# Patient Record
Sex: MALE | Race: WHITE | Employment: FULL TIME | URBAN - METROPOLITAN AREA
[De-identification: names, ages, dates, MRNs, and addresses within clinical notes are randomized per-mention and may not be internally consistent; named-entity substitution may affect disease eponyms.]

---

## 2022-09-23 ENCOUNTER — HOSPITAL ENCOUNTER (EMERGENCY)
Facility: CLINIC | Age: 52
Discharge: HOME | End: 2022-09-23
Attending: EMERGENCY MEDICINE
Payer: COMMERCIAL

## 2022-09-23 ENCOUNTER — APPOINTMENT (OUTPATIENT)
Dept: GENERAL RADIOLOGY | Facility: CLINIC | Age: 52
End: 2022-09-23
Payer: COMMERCIAL

## 2022-09-23 VITALS
WEIGHT: 150 LBS | HEIGHT: 72 IN | RESPIRATION RATE: 16 BRPM | DIASTOLIC BLOOD PRESSURE: 96 MMHG | TEMPERATURE: 100 F | HEART RATE: 98 BPM | BODY MASS INDEX: 20.32 KG/M2 | OXYGEN SATURATION: 95 % | SYSTOLIC BLOOD PRESSURE: 128 MMHG

## 2022-09-23 DIAGNOSIS — B34.9 VIRAL ILLNESS: Primary | ICD-10-CM

## 2022-09-23 LAB
FLU A ANTIGEN: NEGATIVE
FLU B ANTIGEN: NEGATIVE
SARS-COV-2, RAPID: NOT DETECTED
SPECIMEN DESCRIPTION: NORMAL

## 2022-09-23 PROCEDURE — 87635 SARS-COV-2 COVID-19 AMP PRB: CPT

## 2022-09-23 PROCEDURE — 99284 EMERGENCY DEPT VISIT MOD MDM: CPT

## 2022-09-23 PROCEDURE — 6370000000 HC RX 637 (ALT 250 FOR IP): Performed by: EMERGENCY MEDICINE

## 2022-09-23 PROCEDURE — 71045 X-RAY EXAM CHEST 1 VIEW: CPT

## 2022-09-23 PROCEDURE — 87804 INFLUENZA ASSAY W/OPTIC: CPT

## 2022-09-23 RX ORDER — ACETAMINOPHEN 325 MG/1
650 TABLET ORAL ONCE
Status: COMPLETED | OUTPATIENT
Start: 2022-09-23 | End: 2022-09-23

## 2022-09-23 RX ORDER — BENZONATATE 100 MG/1
100 CAPSULE ORAL 3 TIMES DAILY PRN
Qty: 30 CAPSULE | Refills: 0 | Status: SHIPPED | OUTPATIENT
Start: 2022-09-23 | End: 2022-10-03

## 2022-09-23 RX ADMIN — ACETAMINOPHEN 650 MG: 325 TABLET, FILM COATED ORAL at 21:09

## 2022-09-23 ASSESSMENT — PAIN DESCRIPTION - LOCATION: LOCATION: RIB CAGE

## 2022-09-23 ASSESSMENT — PAIN SCALES - GENERAL: PAINLEVEL_OUTOF10: 4

## 2022-09-23 ASSESSMENT — PAIN DESCRIPTION - FREQUENCY: FREQUENCY: INTERMITTENT

## 2022-09-23 ASSESSMENT — PAIN DESCRIPTION - DESCRIPTORS: DESCRIPTORS: TIGHTNESS

## 2022-09-23 ASSESSMENT — PAIN DESCRIPTION - PAIN TYPE: TYPE: ACUTE PAIN

## 2022-09-23 ASSESSMENT — PAIN DESCRIPTION - ONSET: ONSET: ON-GOING

## 2022-09-23 ASSESSMENT — PAIN DESCRIPTION - ORIENTATION: ORIENTATION: RIGHT

## 2022-09-24 NOTE — ED PROVIDER NOTES
nontender bowel sounds active throughout  Neuro: Patient has no gross focal neurological deficits at bedside exam    DIFFERENTIAL DIAGNOSIS/ MDM:     Viral illness COVID-pneumonia    DIAGNOSTIC RESULTS     EKG: All EKG's are interpreted by the Emergency Department Physician who either signs or Co-signs this chart in the absence of a cardiologist.        RADIOLOGY:   I directly visualized the following  images and reviewed the radiologist interpretations:  XR CHEST PORTABLE   Final Result      No acute findings in the chest.               LABS:  Labs Reviewed   RAPID INFLUENZA A/B ANTIGENS   COVID-19, RAPID         EMERGENCY DEPARTMENT COURSE:   Vitals:    Vitals:    09/23/22 2046 09/23/22 2153   BP: (!) 128/96    Pulse: (!) 102 98   Resp: 16    Temp: (!) 101.4 °F (38.6 °C) 100 °F (37.8 °C)   TempSrc: Oral Oral   SpO2: 95%    Weight: 68 kg (150 lb)    Height: 6' (1.829 m)      -------------------------  BP: (!) 128/96, Temp: 100 °F (37.8 °C), Heart Rate: 98, Resp: 16    Orders Placed This Encounter   Medications    acetaminophen (TYLENOL) tablet 650 mg    benzonatate (TESSALON PERLES) 100 MG capsule     Sig: Take 1 capsule by mouth 3 times daily as needed for Cough     Dispense:  30 capsule     Refill:  0           Re-evaluation Notes    X-ray per radiology shows nothing acute both COVID and influenza swabs are negative however findings are most consistent with viral etiology at this time patient will be discharged with symptomatic treatment follow-up as directed return if worse    CRITICAL CARE:   None      CONSULTS:      PROCEDURES:  None    FINAL IMPRESSION      1.  Viral illness          DISPOSITION/PLAN   DISPOSITION Decision To Discharge 09/23/2022 09:49:52 PM      Condition on Disposition    Stable    PATIENT REFERRED TO:  DO Kate Vickers  [id number] [phone]    In 2 days        DISCHARGE MEDICATIONS:  Discharge Medication List as of 9/23/2022  9:50 PM START taking these medications    Details   benzonatate (TESSALON PERLES) 100 MG capsule Take 1 capsule by mouth 3 times daily as needed for Cough, Disp-30 capsule, R-0Print             (Please note that portions of this note were completed with a voice recognition program.  Efforts were made to edit the dictations but occasionally words are mis-transcribed.)    Maldonado Galeana MD,, MD, F.A.C.E.P.   Attending Emergency Physician        Maldonado Galeana MD  09/24/22 7546

## 2022-11-18 ENCOUNTER — HOSPITAL ENCOUNTER (OUTPATIENT)
Dept: CT IMAGING | Facility: CLINIC | Age: 52
Discharge: HOME | End: 2022-11-20
Payer: COMMERCIAL

## 2022-11-18 DIAGNOSIS — R63.4 UNINTENDED WEIGHT LOSS: ICD-10-CM

## 2022-11-18 DIAGNOSIS — R91.8 PULMONARY NODULES: ICD-10-CM

## 2022-11-18 PROCEDURE — 71250 CT THORAX DX C-: CPT

## 2024-01-03 ENCOUNTER — OFFICE VISIT (OUTPATIENT)
Dept: DERMATOLOGY | Age: 54
End: 2024-01-03

## 2024-01-03 VITALS
HEART RATE: 90 BPM | OXYGEN SATURATION: 99 % | TEMPERATURE: 97.3 F | DIASTOLIC BLOOD PRESSURE: 81 MMHG | HEIGHT: 72 IN | WEIGHT: 143 LBS | SYSTOLIC BLOOD PRESSURE: 122 MMHG | BODY MASS INDEX: 19.37 KG/M2

## 2024-01-03 DIAGNOSIS — L30.8 OTHER SPECIFIED DERMATITIS: Primary | ICD-10-CM

## 2024-01-03 RX ORDER — LIDOCAINE HYDROCHLORIDE AND EPINEPHRINE 10; 10 MG/ML; UG/ML
0.7 INJECTION, SOLUTION INFILTRATION; PERINEURAL ONCE
Status: SHIPPED | OUTPATIENT
Start: 2024-01-03

## 2024-01-03 RX ORDER — TRIAMCINOLONE ACETONIDE 1 MG/G
CREAM TOPICAL
Qty: 454 G | Refills: 1 | Status: SHIPPED | OUTPATIENT
Start: 2024-01-03

## 2024-01-03 NOTE — PROGRESS NOTES
Dermatology Patient Note  [facility], [facility] DERMATOLOGY  [address]  [address]  [place] [address]  Dept: [phone]  Dept Fax: [phone]      VISITDATE: 1/3/2024   REFERRING PROVIDER: Vanessa Mclaughlin APRN*      Kyle Guzman is a 53 y.o. male  who presents today in the office for:    New Patient (Kyle presents for evaluation of rash mainly legs/ankles started 4 months ago. Rash is itching, burning, painful . No new soaps, creams, detergents, lotions, or perfumes have been used. He has used  prescribed Elimite 5 % cream, medrol Dose pack 4 MG  without relief of symptoms. Patient was seen approx 1 month ago for sx. And was prescribed prescribed Elimite 5 % cream, medrol Dose pack 4 MG. Sores wont heal./)      HISTORY OF PRESENT ILLNESS:  Patient presents for evaluation of rash. He presented to his PCP on 12/11/23 stating the rash started 4 month prior and affected the glutaeus region. He described it to be painful, pruritic, burn, and has worsened over time. He denies the use of any new hygiene products. He has used prescribed Elimite 5% cream, medrol Dose pack, and Elocon cream with no relief. He was prescribed Keflex 500 mg BID x 10 days and vistaril 25 mg TID as needed for itching.    The patient reports that the rash started around the end of August after visiting his parents. He originally thought they were chiggers as he has had those in the past. He states he has checked for bed bugs with no findings. The rash originally affected the bilateral lower extremities but he states it has recently radiated to his bilateral arms (x 1 week), glutaeus region, and back. The area that is most pruritic and active at the moment is on his left arm. The rash on the bilateral lower extremities cuts off right where his pants reach. Since it started some of the lesions have healed but overall the rash has stayed the same.  He admits to scratching the

## 2024-01-03 NOTE — PATIENT INSTRUCTIONS
- if biopsy demonstrates prurigo nodularis, will order further labs   -start triamcinolone cream to all affected areas (besides the face) twice daily

## 2024-01-04 ENCOUNTER — NURSE ONLY (OUTPATIENT)
Dept: LAB | Age: 54
End: 2024-01-04

## 2024-02-11 RX ORDER — MOMETASONE FUROATE 1 MG/G
CREAM TOPICAL
Qty: 50 G | Refills: 0 | Status: SHIPPED | OUTPATIENT
Start: 2024-02-11

## 2024-03-07 NOTE — PROGRESS NOTES
Dermatology Patient Note  [facility], [facility] DERMATOLOGY  [address]  [address]  [place] [address]  Dept: [phone]  Dept Fax: [phone]      VISITDATE: 3/14/2024   REFERRING PROVIDER: No ref. provider found      Kyle Guzman is a 53 y.o. male  who presents today in the office for:    Other (2 mo F/U Dermatitis/Excoriations patient says he is still having flare-ups. He is using Elocon but is out of the C)      HISTORY OF PRESENT ILLNESS:  Patient presents for a 2 month follow up for dermatitis. At , a biopsy of the left upper arm showed an eczema-like reaction. Patient was prescribed triamcinolone cream.     Patient reports that he is still experiencing flare ups. He is using the Elocon, but is out of the triamcinolone. He notes that triamcinolone was very helpful for relieving the itch.     Denies history of asthma or allergies.     Biopsy results:  FINAL DIAGNOSIS:   Skin, left upper arm, punch biopsy:     Subacute spongiotic dermatitis with eosinophils, features of   excoriation and  incipient blister formation    Please see microscopic description     Specimen:   SKIN BIOPSY, LEFT UPPER ARM       Gross Examination:   The container is labeled Kyle Guzman, left upper arm.  Received in   formalin is an unoriented punch biopsy of skin measuring about 0.4 cm   in diameter x 0.3 cm.  The specimen is bisected.  1 ns.   SMW/DKR:v_almus_p     Microscopic Examination:   Sections show a somewhat demarcated zone of spongiosis with a wet serum   crust and with underlying features of excoriation and with incipient   blister formation.  Eosinophils are present within the serum crust.   There is a surrounding perivascular infiltrate of lymphocytes,   histiocytes and eosinophils within the superficial dermis.  Only mild   spongiosis is noted at the biopsy edges.  Deeper levels are examined.     The findings are somewhat nonspecific.  The histologic

## 2024-03-14 ENCOUNTER — OFFICE VISIT (OUTPATIENT)
Dept: DERMATOLOGY | Age: 54
End: 2024-03-14
Payer: COMMERCIAL

## 2024-03-14 VITALS
HEART RATE: 98 BPM | OXYGEN SATURATION: 97 % | SYSTOLIC BLOOD PRESSURE: 136 MMHG | TEMPERATURE: 99.1 F | BODY MASS INDEX: 19.61 KG/M2 | DIASTOLIC BLOOD PRESSURE: 87 MMHG | WEIGHT: 144.6 LBS

## 2024-03-14 DIAGNOSIS — L30.8 OTHER SPECIFIED DERMATITIS: ICD-10-CM

## 2024-03-14 DIAGNOSIS — L28.1 PRURIGO NODULARIS: Primary | ICD-10-CM

## 2024-03-14 DIAGNOSIS — L20.84 INTRINSIC ATOPIC DERMATITIS: ICD-10-CM

## 2024-03-14 PROCEDURE — 99214 OFFICE O/P EST MOD 30 MIN: CPT | Performed by: DERMATOLOGY

## 2024-03-14 RX ORDER — DUPILUMAB 300 MG/2ML
INJECTION, SOLUTION SUBCUTANEOUS
Qty: 4 ML | Refills: 0 | Status: ACTIVE | OUTPATIENT
Start: 2024-03-14

## 2024-03-14 RX ORDER — DUPILUMAB 300 MG/2ML
INJECTION, SOLUTION SUBCUTANEOUS
Qty: 4 ML | Refills: 2 | Status: ACTIVE | OUTPATIENT
Start: 2024-03-14

## 2024-03-14 RX ORDER — TRIAMCINOLONE ACETONIDE 1 MG/G
CREAM TOPICAL
Qty: 454 G | Refills: 2 | Status: SHIPPED | OUTPATIENT
Start: 2024-03-14

## 2024-03-18 ENCOUNTER — HOSPITAL ENCOUNTER (OUTPATIENT)
Age: 54
Setting detail: SPECIMEN
Discharge: HOME | End: 2024-03-18

## 2024-03-18 DIAGNOSIS — L28.1 PRURIGO NODULARIS: ICD-10-CM

## 2024-03-18 LAB
BASOPHILS # BLD: 0.05 K/UL (ref 0–0.2)
BASOPHILS NFR BLD: 1 % (ref 0–2)
EOSINOPHIL # BLD: 0.34 K/UL (ref 0–0.44)
EOSINOPHILS RELATIVE PERCENT: 6 % (ref 1–4)
ERYTHROCYTE [DISTWIDTH] IN BLOOD BY AUTOMATED COUNT: 12.9 % (ref 11.8–14.4)
HCT VFR BLD AUTO: 46.2 % (ref 40.7–50.3)
HGB BLD-MCNC: 15.6 G/DL (ref 13–17)
IMM GRANULOCYTES # BLD AUTO: <0.03 K/UL (ref 0–0.3)
IMM GRANULOCYTES NFR BLD: 0 %
LYMPHOCYTES NFR BLD: 1.89 K/UL (ref 1.1–3.7)
LYMPHOCYTES RELATIVE PERCENT: 35 % (ref 24–43)
MCH RBC QN AUTO: 32.4 PG (ref 25.2–33.5)
MCHC RBC AUTO-ENTMCNC: 33.8 G/DL (ref 28.4–34.8)
MCV RBC AUTO: 95.9 FL (ref 82.6–102.9)
MONOCYTES NFR BLD: 0.76 K/UL (ref 0.1–1.2)
MONOCYTES NFR BLD: 14 % (ref 3–12)
NEUTROPHILS NFR BLD: 44 % (ref 36–65)
NEUTS SEG NFR BLD: 2.37 K/UL (ref 1.5–8.1)
NRBC BLD-RTO: 0 PER 100 WBC
PLATELET # BLD AUTO: 263 K/UL (ref 138–453)
PMV BLD AUTO: 9.8 FL (ref 8.1–13.5)
RBC # BLD AUTO: 4.82 M/UL (ref 4.21–5.77)
WBC OTHER # BLD: 5.4 K/UL (ref 3.5–11.3)

## 2024-03-25 ENCOUNTER — HOSPITAL ENCOUNTER (OUTPATIENT)
Age: 54
Setting detail: SPECIMEN
Discharge: HOME | End: 2024-03-25

## 2024-03-25 DIAGNOSIS — L28.1 PRURIGO NODULARIS: ICD-10-CM

## 2024-03-25 LAB
ALBUMIN SERPL-MCNC: 4.3 G/DL (ref 3.5–5.2)
ALBUMIN/GLOB SERPL: 2 {RATIO} (ref 1–2.5)
ALP SERPL-CCNC: 52 U/L (ref 40–129)
ALT SERPL-CCNC: 13 U/L (ref 10–50)
ANION GAP SERPL CALCULATED.3IONS-SCNC: 11 MMOL/L (ref 9–16)
AST SERPL-CCNC: 26 U/L (ref 10–50)
BILIRUB SERPL-MCNC: 0.3 MG/DL (ref 0–1.2)
BUN SERPL-MCNC: 5 MG/DL (ref 6–20)
CALCIUM SERPL-MCNC: 9.1 MG/DL (ref 8.6–10.4)
CHLORIDE SERPL-SCNC: 104 MMOL/L (ref 98–107)
CO2 SERPL-SCNC: 25 MMOL/L (ref 20–31)
CREAT SERPL-MCNC: 0.9 MG/DL (ref 0.7–1.2)
FERRITIN SERPL-MCNC: 228 NG/ML (ref 30–400)
GFR SERPL CREATININE-BSD FRML MDRD: >90 ML/MIN/1.73M2
GLUCOSE SERPL-MCNC: 90 MG/DL (ref 74–99)
HAV IGM SERPL QL IA: NONREACTIVE
HBV CORE IGM SERPL QL IA: NONREACTIVE
HBV SURFACE AG SERPL QL IA: NONREACTIVE
HCV AB SERPL QL IA: NONREACTIVE
HIV 1+2 AB+HIV1 P24 AG SERPL QL IA: NONREACTIVE
IRON SATN MFR SERPL: 65 % (ref 20–55)
IRON SERPL-MCNC: 181 UG/DL (ref 61–157)
POTASSIUM SERPL-SCNC: 4.2 MMOL/L (ref 3.7–5.3)
PROT SERPL-MCNC: 7 G/DL (ref 6.6–8.7)
SODIUM SERPL-SCNC: 140 MMOL/L (ref 136–145)
TIBC SERPL-MCNC: 279 UG/DL (ref 250–450)
TSH SERPL DL<=0.05 MIU/L-ACNC: 2.31 UIU/ML (ref 0.27–4.2)
UNSATURATED IRON BINDING CAPACITY: 98 UG/DL (ref 112–347)

## 2024-03-26 LAB
ALBUMIN PERCENT: 68 % (ref 45–65)
ALBUMIN SERPL-MCNC: 4.6 G/DL (ref 3.2–5.2)
ALPHA 2 PERCENT: 10 % (ref 6–13)
ALPHA1 GLOB SERPL ELPH-MCNC: 0.2 G/DL (ref 0.1–0.4)
ALPHA1 GLOB SERPL ELPH-MCNC: 3 % (ref 3–6)
ALPHA2 GLOB SERPL ELPH-MCNC: 0.7 G/DL (ref 0.5–0.9)
B-GLOBULIN SERPL ELPH-MCNC: 0.6 G/DL (ref 0.5–1.1)
B-GLOBULIN SERPL ELPH-MCNC: 8 % (ref 11–19)
GAMMA GLOB SERPL ELPH-MCNC: 0.7 G/DL (ref 0.5–1.5)
GAMMA GLOBULIN %: 11 % (ref 9–20)
PATHOLOGIST: ABNORMAL
PROT PATTERN SERPL ELPH-IMP: ABNORMAL
PROT SERPL-MCNC: 6.7 G/DL (ref 6.6–8.7)
TOTAL PROT. SUM,%: 100 % (ref 98–102)
TOTAL PROT. SUM: 6.8 G/DL (ref 6.3–8.2)

## 2024-04-12 ENCOUNTER — NURSE ONLY (OUTPATIENT)
Dept: DERMATOLOGY | Age: 54
End: 2024-04-12

## 2024-04-12 VITALS — TEMPERATURE: 98.6 F | WEIGHT: 146 LBS | HEIGHT: 72 IN | BODY MASS INDEX: 19.77 KG/M2

## 2024-04-12 DIAGNOSIS — Z71.89 OTHER SPECIFIED COUNSELING: Primary | ICD-10-CM

## 2024-04-12 NOTE — PROGRESS NOTES
Kyle Guzman came in office today for instruction of injection training with the injection of dupixent  medication. Explained to patient that 1st injection is a 600 mg  dose, every injection after that will just be a single dose. Went over the correct dosing schedule with the patient. Today we reviewed the brochure as well as  the correct injection sites, and the correct angle to hold the pen/or PFS (prefilled syringe). The patient was also educated on how long to hold pen in place. The patient was informed that after hearing the click on the pen/PFS the transfer of medication begins. One injection given into left abdomen  and the other patient attempted to give himself but didn't hold pen taught to skin and some of the medication did not go into the skin. We did not have samples to replace so after speaking to Lawson it was decided that patient would just not have the loading dose but patient was advised he could call the  to see if it could be replaced and patient was agreeable to this.  Patient tolerated the injections well. Patient was asked to wait 15 minutes before leaving office to make sure they did not have a negative reaction to the medication. Patient advised they felt fine after 15 minutes of observation and were released.     [id number]  [id number]  EXP-1/31/2026  Patient supplied

## 2024-05-13 ENCOUNTER — NURSE ONLY (OUTPATIENT)
Dept: DERMATOLOGY | Age: 54
End: 2024-05-13
Payer: COMMERCIAL

## 2024-05-13 VITALS — TEMPERATURE: 99.9 F | WEIGHT: 140 LBS | HEIGHT: 72 IN | BODY MASS INDEX: 18.96 KG/M2

## 2024-05-13 DIAGNOSIS — Z71.89 OTHER SPECIFIED COUNSELING: Primary | ICD-10-CM

## 2024-05-13 PROCEDURE — 96372 THER/PROPH/DIAG INJ SC/IM: CPT | Performed by: DERMATOLOGY

## 2024-05-13 NOTE — PROGRESS NOTES
Kyle Guzman came in office today for instruction of injection training with the injection of dupixent 300 mg medication.  Went over the correct dosing schedule with the patient. Today we reviewed the brochure as well as  the correct injection sites, and the correct angle to hold the pen/or PFS (prefilled syringe). The patient was also educated on how long to hold pen in place. The patient was informed that after hearing the click on the pen/PFS the transfer of medication begins. One injection given into left abdomen  Patient tolerated the injections well. Patient was asked to wait 15 minutes before leaving office to make sure they did not have a negative reaction to the medication. Patient advised they felt fine after 15 minutes of observation and were released.     [id number]  [id number]  IHY-2024-18-31

## 2024-06-07 DIAGNOSIS — L28.1 PRURIGO NODULARIS: ICD-10-CM

## 2024-06-07 RX ORDER — DUPILUMAB 300 MG/2ML
INJECTION, SOLUTION SUBCUTANEOUS
Qty: 4 ML | Refills: 2 | Status: SHIPPED | OUTPATIENT
Start: 2024-06-07

## 2024-06-07 NOTE — TELEPHONE ENCOUNTER
Future Appointments   Date Time Provider Department Center   6/19/2024  8:30 AM Didi Luque MD  derm [id number]

## 2024-06-07 NOTE — TELEPHONE ENCOUNTER
----- Message from Long [place] sent at 6/7/2024 10:13 AM EDT -----  Regarding: Dupixent reorder needed  Good morning,     I received a call from [facility] stating this patient needs a reauth on Dupixent. Could you send a reorder of rx so that I can track the pa status ( looks like patient should be out or almost out of refills)    Thanks,   long

## 2024-06-13 NOTE — PROGRESS NOTES
occurrence of infections aside from common colds or gastroenteritis. Patient was counseled to call if he/she develops any symptoms concerning for infection. Patient understands the need for continued lab monitoring while on the medication. Patient will not receive live vaccines while on the medication.   - Patient is 18+ years old, therefore dose is: 600 mg loading dose followed by 300 mg every 2 weeks  - advised pt to contact the office if post-injection joint pain worsens    RTC 6 months prurigo nodularis     No future appointments.        There are no Patient Instructions on file for this visit.      I, Emily Murphy, personally scribed the services dictated to me by Dr. Luque in this documentation.     I, Dr. Luque, personally performed the services described in this documentation, as scribed by Emily Murphy in my presence, and it is both accurate and complete.

## 2024-06-19 ENCOUNTER — OFFICE VISIT (OUTPATIENT)
Dept: DERMATOLOGY | Age: 54
End: 2024-06-19
Payer: COMMERCIAL

## 2024-06-19 VITALS
DIASTOLIC BLOOD PRESSURE: 82 MMHG | TEMPERATURE: 99 F | WEIGHT: 140.6 LBS | BODY MASS INDEX: 19.07 KG/M2 | SYSTOLIC BLOOD PRESSURE: 114 MMHG | HEART RATE: 99 BPM | OXYGEN SATURATION: 97 %

## 2024-06-19 DIAGNOSIS — L28.1 PRURIGO NODULARIS: Primary | ICD-10-CM

## 2024-06-19 DIAGNOSIS — L20.84 INTRINSIC ATOPIC DERMATITIS: ICD-10-CM

## 2024-06-19 DIAGNOSIS — Z71.89 OTHER SPECIFIED COUNSELING: ICD-10-CM

## 2024-06-19 PROCEDURE — 99213 OFFICE O/P EST LOW 20 MIN: CPT | Performed by: DERMATOLOGY

## 2024-06-19 RX ORDER — DUPILUMAB 300 MG/2ML
INJECTION, SOLUTION SUBCUTANEOUS
Qty: 4 ML | Refills: 5 | Status: ACTIVE | OUTPATIENT
Start: 2024-06-19

## 2024-09-20 ENCOUNTER — OFFICE VISIT (OUTPATIENT)
Dept: PRIMARY CARE CLINIC | Age: 54
End: 2024-09-20
Payer: COMMERCIAL

## 2024-09-20 VITALS
SYSTOLIC BLOOD PRESSURE: 136 MMHG | TEMPERATURE: 98 F | BODY MASS INDEX: 18.99 KG/M2 | HEART RATE: 91 BPM | DIASTOLIC BLOOD PRESSURE: 93 MMHG | OXYGEN SATURATION: 97 % | WEIGHT: 140 LBS

## 2024-09-20 DIAGNOSIS — R07.81 RIB PAIN ON RIGHT SIDE: Primary | ICD-10-CM

## 2024-09-20 PROCEDURE — 99203 OFFICE O/P NEW LOW 30 MIN: CPT | Performed by: NURSE PRACTITIONER

## 2024-09-20 RX ORDER — CYCLOBENZAPRINE HCL 5 MG
5 TABLET ORAL 3 TIMES DAILY PRN
Qty: 20 TABLET | Refills: 0 | Status: SHIPPED | OUTPATIENT
Start: 2024-09-20 | End: 2024-09-30

## 2024-09-20 RX ORDER — PREDNISONE 20 MG/1
40 TABLET ORAL DAILY
Qty: 10 TABLET | Refills: 0 | Status: SHIPPED | OUTPATIENT
Start: 2024-09-20 | End: 2024-09-25

## 2024-09-20 SDOH — ECONOMIC STABILITY: FOOD INSECURITY: WITHIN THE PAST 12 MONTHS, YOU WORRIED THAT YOUR FOOD WOULD RUN OUT BEFORE YOU GOT MONEY TO BUY MORE.: NEVER TRUE

## 2024-09-20 SDOH — ECONOMIC STABILITY: FOOD INSECURITY: WITHIN THE PAST 12 MONTHS, THE FOOD YOU BOUGHT JUST DIDN'T LAST AND YOU DIDN'T HAVE MONEY TO GET MORE.: NEVER TRUE

## 2024-09-20 SDOH — ECONOMIC STABILITY: INCOME INSECURITY: HOW HARD IS IT FOR YOU TO PAY FOR THE VERY BASICS LIKE FOOD, HOUSING, MEDICAL CARE, AND HEATING?: NOT HARD AT ALL

## 2024-09-20 ASSESSMENT — ENCOUNTER SYMPTOMS
ABDOMINAL PAIN: 0
SORE THROAT: 0
COUGH: 0
SINUS PRESSURE: 0
RESPIRATORY NEGATIVE: 1
CHEST TIGHTNESS: 0
ORTHOPNEA: 0
EYE DISCHARGE: 0
SPUTUM PRODUCTION: 0
NAUSEA: 0
WHEEZING: 0
EYE REDNESS: 0
SHORTNESS OF BREATH: 0
VOICE CHANGE: 0

## 2024-09-20 ASSESSMENT — PATIENT HEALTH QUESTIONNAIRE - PHQ9
SUM OF ALL RESPONSES TO PHQ QUESTIONS 1-9: 0
SUM OF ALL RESPONSES TO PHQ QUESTIONS 1-9: 0
1. LITTLE INTEREST OR PLEASURE IN DOING THINGS: NOT AT ALL
SUM OF ALL RESPONSES TO PHQ QUESTIONS 1-9: 0
SUM OF ALL RESPONSES TO PHQ9 QUESTIONS 1 & 2: 0
SUM OF ALL RESPONSES TO PHQ QUESTIONS 1-9: 0
2. FEELING DOWN, DEPRESSED OR HOPELESS: NOT AT ALL

## 2024-09-26 ENCOUNTER — HOSPITAL ENCOUNTER (OUTPATIENT)
Dept: CT IMAGING | Facility: CLINIC | Age: 54
Discharge: HOME | End: 2024-09-28
Payer: COMMERCIAL

## 2024-09-26 ENCOUNTER — HOSPITAL ENCOUNTER (INPATIENT)
Age: 54
LOS: 6 days | Discharge: HOME | DRG: 178 | End: 2024-10-02
Attending: INTERNAL MEDICINE | Admitting: STUDENT IN AN ORGANIZED HEALTH CARE EDUCATION/TRAINING PROGRAM
Payer: COMMERCIAL

## 2024-09-26 ENCOUNTER — HOSPITAL ENCOUNTER (OUTPATIENT)
Age: 54
Setting detail: SPECIMEN
Discharge: HOME | End: 2024-09-26

## 2024-09-26 DIAGNOSIS — R11.0 NAUSEA: ICD-10-CM

## 2024-09-26 DIAGNOSIS — R07.9 RIGHT-SIDED CHEST PAIN: ICD-10-CM

## 2024-09-26 DIAGNOSIS — R10.11 RUQ PAIN: ICD-10-CM

## 2024-09-26 DIAGNOSIS — R06.9 RESPIRATORY ABNORMALITY: ICD-10-CM

## 2024-09-26 PROBLEM — J98.4 CAVITATING MASS IN RIGHT UPPER LUNG LOBE: Status: ACTIVE | Noted: 2024-09-26

## 2024-09-26 PROBLEM — J18.9 LUNG INFECTION: Status: ACTIVE | Noted: 2024-09-26

## 2024-09-26 LAB
ALBUMIN SERPL-MCNC: 3.8 G/DL (ref 3.5–5.2)
ALBUMIN/GLOB SERPL: 1 {RATIO} (ref 1–2.5)
ALP SERPL-CCNC: 54 U/L (ref 40–129)
ALT SERPL-CCNC: 13 U/L (ref 10–50)
ANION GAP SERPL CALCULATED.3IONS-SCNC: 9 MMOL/L (ref 9–16)
AST SERPL-CCNC: 18 U/L (ref 10–50)
B PARAP IS1001 DNA NPH QL NAA+NON-PROBE: NOT DETECTED
B PERT DNA SPEC QL NAA+PROBE: NOT DETECTED
BASOPHILS # BLD: 0 K/UL (ref 0–0.2)
BASOPHILS NFR BLD: 0 % (ref 0–2)
BILIRUB SERPL-MCNC: 0.6 MG/DL (ref 0–1.2)
BUN SERPL-MCNC: 9 MG/DL (ref 6–20)
C PNEUM DNA NPH QL NAA+NON-PROBE: NOT DETECTED
CALCIUM SERPL-MCNC: 9.2 MG/DL (ref 8.6–10.4)
CHLORIDE SERPL-SCNC: 97 MMOL/L (ref 98–107)
CO2 SERPL-SCNC: 28 MMOL/L (ref 20–31)
CREAT SERPL-MCNC: 0.9 MG/DL (ref 0.7–1.2)
D DIMER PPP FEU-MCNC: 1.1 UG/ML FEU (ref 0–0.57)
EOSINOPHIL # BLD: 0.21 K/UL (ref 0–0.4)
EOSINOPHILS RELATIVE PERCENT: 2 % (ref 1–4)
ERYTHROCYTE [DISTWIDTH] IN BLOOD BY AUTOMATED COUNT: 12.3 % (ref 11.8–14.4)
FLUAV RNA NPH QL NAA+NON-PROBE: NOT DETECTED
FLUBV RNA NPH QL NAA+NON-PROBE: NOT DETECTED
GFR, ESTIMATED: >90 ML/MIN/1.73M2
GLUCOSE SERPL-MCNC: 87 MG/DL (ref 74–99)
HADV DNA NPH QL NAA+NON-PROBE: NOT DETECTED
HCOV 229E RNA NPH QL NAA+NON-PROBE: NOT DETECTED
HCOV HKU1 RNA NPH QL NAA+NON-PROBE: NOT DETECTED
HCOV NL63 RNA NPH QL NAA+NON-PROBE: NOT DETECTED
HCOV OC43 RNA NPH QL NAA+NON-PROBE: NOT DETECTED
HCT VFR BLD AUTO: 46.5 % (ref 40.7–50.3)
HGB BLD-MCNC: 15.8 G/DL (ref 13–17)
HMPV RNA NPH QL NAA+NON-PROBE: NOT DETECTED
HPIV1 RNA NPH QL NAA+NON-PROBE: NOT DETECTED
HPIV2 RNA NPH QL NAA+NON-PROBE: NOT DETECTED
HPIV3 RNA NPH QL NAA+NON-PROBE: NOT DETECTED
HPIV4 RNA NPH QL NAA+NON-PROBE: NOT DETECTED
IMM GRANULOCYTES # BLD AUTO: 0 K/UL (ref 0–0.3)
IMM GRANULOCYTES NFR BLD: 0 %
LYMPHOCYTES NFR BLD: 2.18 K/UL (ref 1–4.8)
LYMPHOCYTES RELATIVE PERCENT: 21 % (ref 24–44)
M PNEUMO DNA NPH QL NAA+NON-PROBE: NOT DETECTED
MCH RBC QN AUTO: 32.1 PG (ref 25.2–33.5)
MCHC RBC AUTO-ENTMCNC: 34 G/DL (ref 28.4–34.8)
MCV RBC AUTO: 94.5 FL (ref 82.6–102.9)
MONOCYTES NFR BLD: 1.98 K/UL (ref 0.1–0.8)
MONOCYTES NFR BLD: 19 % (ref 1–7)
MORPHOLOGY: NORMAL
NEUTROPHILS NFR BLD: 58 % (ref 36–66)
NEUTS SEG NFR BLD: 6.03 K/UL (ref 1.8–7.7)
NRBC BLD-RTO: 0 PER 100 WBC
PLATELET # BLD AUTO: 303 K/UL (ref 138–453)
PMV BLD AUTO: 9.5 FL (ref 8.1–13.5)
POTASSIUM SERPL-SCNC: 4.6 MMOL/L (ref 3.7–5.3)
PROT SERPL-MCNC: 6.8 G/DL (ref 6.6–8.7)
RBC # BLD AUTO: 4.92 M/UL (ref 4.21–5.77)
RSV RNA NPH QL NAA+NON-PROBE: NOT DETECTED
RV+EV RNA NPH QL NAA+NON-PROBE: NOT DETECTED
SARS-COV-2 RNA NPH QL NAA+NON-PROBE: NOT DETECTED
SODIUM SERPL-SCNC: 134 MMOL/L (ref 136–145)
SPECIMEN DESCRIPTION: NORMAL
WBC OTHER # BLD: 10.4 K/UL (ref 3.5–11.3)

## 2024-09-26 PROCEDURE — 6360000004 HC RX CONTRAST MEDICATION: Performed by: FAMILY MEDICINE

## 2024-09-26 PROCEDURE — 6370000000 HC RX 637 (ALT 250 FOR IP): Performed by: INTERNAL MEDICINE

## 2024-09-26 PROCEDURE — 0202U NFCT DS 22 TRGT SARS-COV-2: CPT

## 2024-09-26 PROCEDURE — 71250 CT THORAX DX C-: CPT

## 2024-09-26 PROCEDURE — 2580000003 HC RX 258: Performed by: NURSE PRACTITIONER

## 2024-09-26 PROCEDURE — 99222 1ST HOSP IP/OBS MODERATE 55: CPT

## 2024-09-26 PROCEDURE — 1200000000 HC SEMI PRIVATE

## 2024-09-26 PROCEDURE — 2060000000 HC ICU INTERMEDIATE R&B

## 2024-09-26 PROCEDURE — 2580000003 HC RX 258: Performed by: FAMILY MEDICINE

## 2024-09-26 PROCEDURE — 74160 CT ABDOMEN W/CONTRAST: CPT

## 2024-09-26 PROCEDURE — 6360000002 HC RX W HCPCS: Performed by: NURSE PRACTITIONER

## 2024-09-26 RX ORDER — CYCLOBENZAPRINE HCL 5 MG
5 TABLET ORAL 3 TIMES DAILY PRN
Status: DISCONTINUED | OUTPATIENT
Start: 2024-09-26 | End: 2024-10-02 | Stop reason: HOSPADM

## 2024-09-26 RX ORDER — ACETAMINOPHEN 325 MG/1
650 TABLET ORAL EVERY 6 HOURS PRN
Status: DISCONTINUED | OUTPATIENT
Start: 2024-09-26 | End: 2024-09-28

## 2024-09-26 RX ORDER — SODIUM CHLORIDE 0.9 % (FLUSH) 0.9 %
10 SYRINGE (ML) INJECTION PRN
Status: DISCONTINUED | OUTPATIENT
Start: 2024-09-26 | End: 2024-10-02 | Stop reason: HOSPADM

## 2024-09-26 RX ORDER — IPRATROPIUM BROMIDE AND ALBUTEROL SULFATE 2.5; .5 MG/3ML; MG/3ML
1 SOLUTION RESPIRATORY (INHALATION) EVERY 4 HOURS PRN
Status: DISCONTINUED | OUTPATIENT
Start: 2024-09-26 | End: 2024-10-02 | Stop reason: HOSPADM

## 2024-09-26 RX ORDER — ONDANSETRON 4 MG/1
4 TABLET, ORALLY DISINTEGRATING ORAL EVERY 8 HOURS PRN
Status: DISCONTINUED | OUTPATIENT
Start: 2024-09-26 | End: 2024-10-02 | Stop reason: HOSPADM

## 2024-09-26 RX ORDER — IOPAMIDOL 755 MG/ML
75 INJECTION, SOLUTION INTRAVASCULAR
Status: COMPLETED | OUTPATIENT
Start: 2024-09-26 | End: 2024-09-26

## 2024-09-26 RX ORDER — OXYCODONE HYDROCHLORIDE 5 MG/1
10 TABLET ORAL EVERY 4 HOURS PRN
Status: DISCONTINUED | OUTPATIENT
Start: 2024-09-26 | End: 2024-09-28

## 2024-09-26 RX ORDER — ALBUTEROL SULFATE 0.83 MG/ML
2.5 SOLUTION RESPIRATORY (INHALATION) EVERY 4 HOURS PRN
Status: DISCONTINUED | OUTPATIENT
Start: 2024-09-26 | End: 2024-10-02 | Stop reason: HOSPADM

## 2024-09-26 RX ORDER — SODIUM CHLORIDE 9 MG/ML
INJECTION, SOLUTION INTRAVENOUS PRN
Status: DISCONTINUED | OUTPATIENT
Start: 2024-09-26 | End: 2024-10-02 | Stop reason: HOSPADM

## 2024-09-26 RX ORDER — SODIUM CHLORIDE 0.9 % (FLUSH) 0.9 %
10 SYRINGE (ML) INJECTION PRN
Status: DISCONTINUED | OUTPATIENT
Start: 2024-09-26 | End: 2024-09-29 | Stop reason: HOSPADM

## 2024-09-26 RX ORDER — SODIUM CHLORIDE 0.9 % (FLUSH) 0.9 %
5-40 SYRINGE (ML) INJECTION EVERY 12 HOURS SCHEDULED
Status: DISCONTINUED | OUTPATIENT
Start: 2024-09-26 | End: 2024-10-02 | Stop reason: HOSPADM

## 2024-09-26 RX ORDER — ONDANSETRON 2 MG/ML
4 INJECTION INTRAMUSCULAR; INTRAVENOUS EVERY 6 HOURS PRN
Status: DISCONTINUED | OUTPATIENT
Start: 2024-09-26 | End: 2024-10-02 | Stop reason: HOSPADM

## 2024-09-26 RX ORDER — ENOXAPARIN SODIUM 100 MG/ML
40 INJECTION SUBCUTANEOUS DAILY
Status: DISCONTINUED | OUTPATIENT
Start: 2024-09-26 | End: 2024-10-02 | Stop reason: HOSPADM

## 2024-09-26 RX ORDER — PREDNISONE 20 MG/1
20 TABLET ORAL DAILY
Status: DISCONTINUED | OUTPATIENT
Start: 2024-09-27 | End: 2024-09-27

## 2024-09-26 RX ORDER — ACETAMINOPHEN 650 MG/1
650 SUPPOSITORY RECTAL EVERY 6 HOURS PRN
Status: DISCONTINUED | OUTPATIENT
Start: 2024-09-26 | End: 2024-09-28

## 2024-09-26 RX ORDER — 0.9 % SODIUM CHLORIDE 0.9 %
70 INTRAVENOUS SOLUTION INTRAVENOUS ONCE
Status: DISCONTINUED | OUTPATIENT
Start: 2024-09-26 | End: 2024-09-29 | Stop reason: HOSPADM

## 2024-09-26 RX ORDER — OXYCODONE HYDROCHLORIDE 5 MG/1
5 TABLET ORAL EVERY 4 HOURS PRN
Status: DISCONTINUED | OUTPATIENT
Start: 2024-09-26 | End: 2024-09-28

## 2024-09-26 RX ORDER — IBUPROFEN 800 MG/1
800 TABLET, FILM COATED ORAL EVERY 8 HOURS PRN
Status: DISCONTINUED | OUTPATIENT
Start: 2024-09-26 | End: 2024-09-28

## 2024-09-26 RX ADMIN — SODIUM CHLORIDE, PRESERVATIVE FREE 10 ML: 5 INJECTION INTRAVENOUS at 21:29

## 2024-09-26 RX ADMIN — ENOXAPARIN SODIUM 40 MG: 100 INJECTION SUBCUTANEOUS at 19:34

## 2024-09-26 RX ADMIN — IOPAMIDOL 75 ML: 755 INJECTION, SOLUTION INTRAVENOUS at 12:53

## 2024-09-26 RX ADMIN — PIPERACILLIN AND TAZOBACTAM 4500 MG: 4; .5 INJECTION, POWDER, LYOPHILIZED, FOR SOLUTION INTRAVENOUS at 22:15

## 2024-09-26 RX ADMIN — SODIUM CHLORIDE, PRESERVATIVE FREE 10 ML: 5 INJECTION INTRAVENOUS at 12:53

## 2024-09-26 RX ADMIN — Medication 70 ML: at 12:53

## 2024-09-26 RX ADMIN — PIPERACILLIN AND TAZOBACTAM 4500 MG: 4; .5 INJECTION, POWDER, LYOPHILIZED, FOR SOLUTION INTRAVENOUS at 19:40

## 2024-09-26 RX ADMIN — SODIUM CHLORIDE: 9 INJECTION, SOLUTION INTRAVENOUS at 19:37

## 2024-09-26 RX ADMIN — OXYCODONE HYDROCHLORIDE 10 MG: 5 TABLET ORAL at 19:33

## 2024-09-26 ASSESSMENT — PAIN DESCRIPTION - LOCATION
LOCATION: RIB CAGE
LOCATION: CHEST
LOCATION: CHEST

## 2024-09-26 ASSESSMENT — PAIN DESCRIPTION - ORIENTATION
ORIENTATION: RIGHT;LOWER

## 2024-09-26 ASSESSMENT — PAIN DESCRIPTION - DESCRIPTORS
DESCRIPTORS: DULL

## 2024-09-26 ASSESSMENT — PAIN DESCRIPTION - ONSET
ONSET: ON-GOING
ONSET: ON-GOING

## 2024-09-26 ASSESSMENT — PAIN SCALES - GENERAL
PAINLEVEL_OUTOF10: 8
PAINLEVEL_OUTOF10: 2
PAINLEVEL_OUTOF10: 8

## 2024-09-26 ASSESSMENT — PAIN DESCRIPTION - PAIN TYPE
TYPE: ACUTE PAIN
TYPE: ACUTE PAIN

## 2024-09-26 ASSESSMENT — PAIN DESCRIPTION - FREQUENCY
FREQUENCY: INTERMITTENT
FREQUENCY: INTERMITTENT

## 2024-09-26 ASSESSMENT — PAIN - FUNCTIONAL ASSESSMENT
PAIN_FUNCTIONAL_ASSESSMENT: PREVENTS OR INTERFERES SOME ACTIVE ACTIVITIES AND ADLS
PAIN_FUNCTIONAL_ASSESSMENT: PREVENTS OR INTERFERES SOME ACTIVE ACTIVITIES AND ADLS

## 2024-09-26 NOTE — PROGRESS NOTES
Pt direct amission to room 1014 from home. vitals taken and telemetry placed. Pt oriented to room and unit routine, including hourly rounding. Call light in reach and safety maintained. Will continue to provide support and education.

## 2024-09-26 NOTE — PROGRESS NOTES
[] Medication Reconciliation was completed and the patient's home medication list was verified. The Med List Status is \"Complete\". The following sources were used to assist with Medication Reconciliation:    [] Med Rec Pharmacist already completed   [] Patient had a list of medications which was transcribed into the EHR.  [] Patient provided bottles of their medications  [] Home medications reviewed and confirmed with   [] Contacted patient's pharmacy to confirm home medications  [] Contacted patient's physician office to confirm home medications  [] Medical Records from another facility and/or Care Everywhere were reviewed  [] MAR from facility requested to be faxed over  [] Unable to complete due to patient condition  [] Unable to validate home medications      [x] There are one or more home medications that need clarification before Medication Reconciliation can be completed. The Med List Status has been marked as In Progress.     To assist with Home Medication Reconciliation the following actions have been taken:    [x] Pharmacy medication reconciliation service requested. (Note: This can be done by sending a Perfect Serve message to The Med Rec Pharmacist or by phoning [phone].)  [] Family requested to bring medications into the hospital  [] Family requested to call hospital with medication list  [] Message left with physician office  [] Request for medical records made to   [] Other

## 2024-09-27 PROBLEM — D84.81 IMMUNOCOMPROMISED STATUS ASSOCIATED WITH INFECTION (HCC): Status: ACTIVE | Noted: 2024-09-27

## 2024-09-27 PROBLEM — B99.9 IMMUNOCOMPROMISED STATUS ASSOCIATED WITH INFECTION (HCC): Status: ACTIVE | Noted: 2024-09-27

## 2024-09-27 LAB
ANION GAP SERPL CALCULATED.3IONS-SCNC: 9 MMOL/L (ref 9–17)
BASOPHILS # BLD: 0 K/UL (ref 0–0.2)
BASOPHILS NFR BLD: 0 %
BUN SERPL-MCNC: 9 MG/DL (ref 6–20)
BUN/CREAT SERPL: 10 (ref 9–20)
CALCIUM SERPL-MCNC: 8.8 MG/DL (ref 8.6–10.4)
CHLORIDE SERPL-SCNC: 99 MMOL/L (ref 98–107)
CO2 SERPL-SCNC: 27 MMOL/L (ref 20–31)
CREAT SERPL-MCNC: 0.9 MG/DL (ref 0.7–1.2)
EOSINOPHIL # BLD: 0.24 K/UL (ref 0–0.4)
EOSINOPHILS RELATIVE PERCENT: 2 % (ref 1–4)
ERYTHROCYTE [DISTWIDTH] IN BLOOD BY AUTOMATED COUNT: 12.2 % (ref 11.8–14.4)
GFR, ESTIMATED: >90 ML/MIN/1.73M2
GLUCOSE BLD-MCNC: 104 MG/DL (ref 75–110)
GLUCOSE BLD-MCNC: 110 MG/DL (ref 75–110)
GLUCOSE BLD-MCNC: 292 MG/DL (ref 75–110)
GLUCOSE SERPL-MCNC: 105 MG/DL (ref 70–99)
HCT VFR BLD AUTO: 42.6 % (ref 40.7–50.3)
HGB BLD-MCNC: 14.6 G/DL (ref 13–17)
IMM GRANULOCYTES # BLD AUTO: 0 K/UL (ref 0–0.3)
IMM GRANULOCYTES NFR BLD: 0 %
LYMPHOCYTES NFR BLD: 1.42 K/UL (ref 1–4.8)
LYMPHOCYTES RELATIVE PERCENT: 12 % (ref 24–44)
MCH RBC QN AUTO: 32.7 PG (ref 25.2–33.5)
MCHC RBC AUTO-ENTMCNC: 34.3 G/DL (ref 28.4–34.8)
MCV RBC AUTO: 95.3 FL (ref 82.6–102.9)
MONOCYTES NFR BLD: 2.48 K/UL (ref 0.2–0.8)
MONOCYTES NFR BLD: 21 % (ref 1–7)
NEUTROPHILS NFR BLD: 65 % (ref 36–66)
NEUTS SEG NFR BLD: 7.66 K/UL (ref 1.8–7.7)
NRBC BLD-RTO: 0 PER 100 WBC
PLATELET # BLD AUTO: 271 K/UL (ref 138–453)
PMV BLD AUTO: 9.5 FL (ref 8.1–13.5)
POTASSIUM SERPL-SCNC: 4.4 MMOL/L (ref 3.7–5.3)
RBC # BLD AUTO: 4.47 M/UL (ref 4.21–5.77)
SODIUM SERPL-SCNC: 135 MMOL/L (ref 135–144)
WBC OTHER # BLD: 11.8 K/UL (ref 3.5–11.3)

## 2024-09-27 PROCEDURE — 1200000000 HC SEMI PRIVATE

## 2024-09-27 PROCEDURE — 82947 ASSAY GLUCOSE BLOOD QUANT: CPT

## 2024-09-27 PROCEDURE — 6370000000 HC RX 637 (ALT 250 FOR IP): Performed by: INTERNAL MEDICINE

## 2024-09-27 PROCEDURE — 87385 HISTOPLASMA CAPSUL AG IA: CPT

## 2024-09-27 PROCEDURE — 86606 ASPERGILLUS ANTIBODY: CPT

## 2024-09-27 PROCEDURE — 87305 ASPERGILLUS AG IA: CPT

## 2024-09-27 PROCEDURE — 36415 COLL VENOUS BLD VENIPUNCTURE: CPT

## 2024-09-27 PROCEDURE — 99223 1ST HOSP IP/OBS HIGH 75: CPT | Performed by: INTERNAL MEDICINE

## 2024-09-27 PROCEDURE — 86480 TB TEST CELL IMMUN MEASURE: CPT

## 2024-09-27 PROCEDURE — 6370000000 HC RX 637 (ALT 250 FOR IP): Performed by: NURSE PRACTITIONER

## 2024-09-27 PROCEDURE — 99232 SBSQ HOSP IP/OBS MODERATE 35: CPT | Performed by: INTERNAL MEDICINE

## 2024-09-27 PROCEDURE — 2580000003 HC RX 258: Performed by: NURSE PRACTITIONER

## 2024-09-27 PROCEDURE — 85025 COMPLETE CBC W/AUTO DIFF WBC: CPT

## 2024-09-27 PROCEDURE — 6360000002 HC RX W HCPCS: Performed by: NURSE PRACTITIONER

## 2024-09-27 PROCEDURE — 87040 BLOOD CULTURE FOR BACTERIA: CPT

## 2024-09-27 PROCEDURE — 80048 BASIC METABOLIC PNL TOTAL CA: CPT

## 2024-09-27 RX ORDER — LINEZOLID 600 MG/1
600 TABLET, FILM COATED ORAL EVERY 12 HOURS SCHEDULED
Status: DISCONTINUED | OUTPATIENT
Start: 2024-09-27 | End: 2024-10-02 | Stop reason: HOSPADM

## 2024-09-27 RX ORDER — BUDESONIDE AND FORMOTEROL FUMARATE DIHYDRATE 160; 4.5 UG/1; UG/1
2 AEROSOL RESPIRATORY (INHALATION)
Status: DISCONTINUED | OUTPATIENT
Start: 2024-09-27 | End: 2024-10-02 | Stop reason: HOSPADM

## 2024-09-27 RX ADMIN — PIPERACILLIN AND TAZOBACTAM 3375 MG: 3; .375 INJECTION, POWDER, LYOPHILIZED, FOR SOLUTION INTRAVENOUS at 23:53

## 2024-09-27 RX ADMIN — OXYCODONE HYDROCHLORIDE 10 MG: 5 TABLET ORAL at 16:51

## 2024-09-27 RX ADMIN — LINEZOLID 600 MG: 600 TABLET, FILM COATED ORAL at 21:39

## 2024-09-27 RX ADMIN — OXYCODONE HYDROCHLORIDE 10 MG: 5 TABLET ORAL at 06:35

## 2024-09-27 RX ADMIN — PREDNISONE 20 MG: 20 TABLET ORAL at 09:47

## 2024-09-27 RX ADMIN — PIPERACILLIN AND TAZOBACTAM 3375 MG: 3; .375 INJECTION, POWDER, LYOPHILIZED, FOR SOLUTION INTRAVENOUS at 16:55

## 2024-09-27 RX ADMIN — ENOXAPARIN SODIUM 40 MG: 100 INJECTION SUBCUTANEOUS at 09:47

## 2024-09-27 RX ADMIN — SODIUM CHLORIDE: 9 INJECTION, SOLUTION INTRAVENOUS at 23:48

## 2024-09-27 RX ADMIN — SODIUM CHLORIDE, PRESERVATIVE FREE 10 ML: 5 INJECTION INTRAVENOUS at 21:39

## 2024-09-27 RX ADMIN — OXYCODONE HYDROCHLORIDE 5 MG: 5 TABLET ORAL at 21:44

## 2024-09-27 RX ADMIN — SODIUM CHLORIDE: 9 INJECTION, SOLUTION INTRAVENOUS at 16:53

## 2024-09-27 RX ADMIN — PIPERACILLIN AND TAZOBACTAM 4500 MG: 4; .5 INJECTION, POWDER, LYOPHILIZED, FOR SOLUTION INTRAVENOUS at 06:08

## 2024-09-27 ASSESSMENT — PAIN DESCRIPTION - ORIENTATION
ORIENTATION: RIGHT;LOWER

## 2024-09-27 ASSESSMENT — PAIN DESCRIPTION - DESCRIPTORS
DESCRIPTORS: SHARP
DESCRIPTORS: DULL

## 2024-09-27 ASSESSMENT — PAIN DESCRIPTION - LOCATION
LOCATION: RIB CAGE
LOCATION: CHEST
LOCATION: RIB CAGE
LOCATION: RIB CAGE

## 2024-09-27 ASSESSMENT — PAIN SCALES - GENERAL
PAINLEVEL_OUTOF10: 7
PAINLEVEL_OUTOF10: 7
PAINLEVEL_OUTOF10: 4
PAINLEVEL_OUTOF10: 6
PAINLEVEL_OUTOF10: 3
PAINLEVEL_OUTOF10: 0
PAINLEVEL_OUTOF10: 3

## 2024-09-27 ASSESSMENT — PAIN - FUNCTIONAL ASSESSMENT
PAIN_FUNCTIONAL_ASSESSMENT: ACTIVITIES ARE NOT PREVENTED
PAIN_FUNCTIONAL_ASSESSMENT: PREVENTS OR INTERFERES SOME ACTIVE ACTIVITIES AND ADLS
PAIN_FUNCTIONAL_ASSESSMENT: PREVENTS OR INTERFERES SOME ACTIVE ACTIVITIES AND ADLS

## 2024-09-27 NOTE — PROGRESS NOTES
Pt remained calm and cooperative tonight. Pt remained on RA. Pt moved from Room 1014 to Room 1005 for isolation purposes and possible TB rule out/airborne precautions. Pt's cavitating mass in lung lobe triggered need for additional testing. Resp Panel performed, came back negative. Quantiferon TB gold testing ordered. Pt NPO w/sips since midnight for possible IR procedure, per in house NP. Pt received PRN arnold at 0635. Pt up as tolerated. Will continue with care plan.    Pain Care Plan  Patient having pain on their rt lower rib cage and rates it a 8. Pain interventions include regular rest periods. Patients goal for pain relief is 5. The need for pain and symptom management will be considered in the discharge planning process to ensure patients comfort.

## 2024-09-27 NOTE — PROGRESS NOTES
[facility]  Office: [phone]  Riley Weaver, DO, Brendan Smith, DO, Kvng Tinsley DO, Bao Weiss, DO, Beronica Almaraz MD, Haylee Guajardo MD, Radha Lee MD, Hope Oliva MD,  Jose Mari MD, Milagro Turner MD, Diamond Shepherd MD,  Dafne Gaona DO, Meliton Sun MD, Brent Serrano MD, Rod Weaver DO, Tabitha Harris MD,  Hernesto Ly DO, Paula Cramer MD, Shana Diego MD, Terese Zhong MD, Marjorie Granda MD,  Reggie Morley MD, Ronald Flores MD, Frida Gomez MD, Saida Hall MD, Boyd Aquino MD, Glen Ace MD, Rigoberto Guo, DO, Khoi Fonseca DO, Derian Hunter DO, Ady Esparza MD, Shirley Waterhouse, CNP,  Marely Crowell CNP, Rigoberto Hanson, CNP,  Joyce Elise, DNP, Estefany Mckenzie, CNP, Chetna Arita, CNP, Nathaly Lange, CNP, Michela Lira, CNP, Courtney Carroll, PA-C, Rosalie Colón, PA-C, Adele Gudino, CNP, Nicol Molina, CNP,  Malinda Du, CNP, Reyna Farias, CNP, Emily Diaz, CNP, Christa Burnett, CNS, Jessica Blanco, CNP, Barbara Klein, CNP, Tracy Schwab, CNP         [facility]   IN-PATIENT SERVICE   [facility]    Progress Note    9/27/2024    1:09 PM    Name:   Kyle Guzman  MRN:     [id number]     Acct:      [id number]   Room:   [facility] Day:  1  Admit Date:  9/26/2024  6:22 PM    PCP:   Sally Donis DO  Code Status:  Full Code    Subjective:     C/C: Chest wall pain, night sweats, cavitary lung lesion    Interval History Status: not changed.     Patient reports decreasing rib and chest wall pain.  Did not have any night sweats overnight but has had night sweats and chills over the last week.  Denies any chest pain, nausea vomiting, abdominal pain or other acute complaints.  Has intermittent cough but no sputum production.    Brief History:     This is a 53-year-old male that presents with a approximately 7 to 8-day history of right sided rib and chest wall pain.  Worsened with movement and deep

## 2024-09-27 NOTE — H&P
having fluid drained from his lungs when he was a young child.    Patient reports last Wednesday (8 days ago) he began having sharp right lower intermittent rib pain that increased with deep inhalation.  After a couple days he went to urgent care.  Chest x-ray negative at that time and was given anti-inflammatory and muscle relaxants.  Sunday he began developing fevers and vomiting.  Yesterday 9/25 he was sent home early from work for generally feeling ill, chills, vomiting, diaphoretic.  He does not work with any chemicals.    Patient was a direct admission from PCP.  D-dimer slightly elevated 1.10, negative leukocytosis, lab work grossly unremarkable.  CT chest positive for right cavitary lesion.    Patient arrived from home, he is alert oriented x 4 and in negative apparent distress and doing well on room air.  He still reports significant right sided pain.  Past Medical History:     Past Medical History:   Diagnosis Date    Arthritis     COPD (chronic obstructive pulmonary disease) ([facility])     COVID-19         Past Surgical History:     Past Surgical History:   Procedure Laterality Date    BUNIONECTOMY Left 2005    ELBOW SURGERY      HERNIA REPAIR      KNEE ARTHROSCOPY Left         Medications Prior to Admission:     Prior to Admission medications    Medication Sig Start Date End Date Taking? Authorizing Provider   dupilumab (DUPIXENT) 300 MG/2ML SOPN injection Inject 300mg SQ at every 2 weeks 6/19/24  Yes Didi Luque MD   ibuprofen (ADVIL;MOTRIN) 800 MG tablet Take 1 tablet by mouth 3 times daily (with meals) 9/26/24   Sally Donis DO   cyclobenzaprine (FLEXERIL) 5 MG tablet Take 1 tablet by mouth 3 times daily as needed for Muscle spasms  Patient not taking: Reported on 9/26/2024 9/20/24 9/30/24  Angela Stevens, APRN - CNP   dupilumab (DUPIXENT) 300 MG/2ML SOPN injection Inject 600mg SQ at week 0. Begin Maintenance Dose at week 2 3/14/24   Didi Luque MD        Allergies:     Chantix  Extraocular Movements: Extraocular movements intact.      Conjunctiva/sclera: Conjunctivae normal.      Pupils: Pupils are equal, round, and reactive to light.   Cardiovascular:      Rate and Rhythm: Normal rate and regular rhythm.      Pulses: Normal pulses.      Heart sounds: Normal heart sounds.   Pulmonary:      Effort: Pulmonary effort is normal. No respiratory distress.      Comments: Right sided diminished lung fields   Abdominal:      General: Abdomen is flat. Bowel sounds are normal.      Palpations: Abdomen is soft.   Musculoskeletal:         General: Normal range of motion.      Cervical back: Normal range of motion and neck supple.   Skin:     General: Skin is warm and dry.      Capillary Refill: Capillary refill takes less than 2 seconds.   Neurological:      General: No focal deficit present.      Mental Status: He is alert and oriented to person, place, and time.   Psychiatric:         Mood and Affect: Mood normal.         Behavior: Behavior normal.         Thought Content: Thought content normal.         Judgment: Judgment normal.         Investigations:      Laboratory Testing:  Recent Results (from the past 24 hour(s))   POCT Urinalysis no Micro    Collection Time: 09/26/24  9:58 AM   Result Value Ref Range    Color, UA samantha     Clarity, UA clear     Glucose, UA POC negative mg/dL    Bilirubin, UA negative     Ketones, UA negative mg/dL    Spec Grav, UA 1.015     Blood, UA POC negative     pH, UA 6.5     Protein, UA POC negative mg/dL    Urobilinogen, UA 0.2 mg/dL    Leukocytes, UA negative     Nitrite, UA negative    CBC with Auto Differential    Collection Time: 09/26/24 10:23 AM   Result Value Ref Range    WBC 10.4 3.5 - 11.3 k/uL    RBC 4.92 4.21 - 5.77 m/uL    Hemoglobin 15.8 13.0 - 17.0 g/dL    Hematocrit 46.5 40.7 - 50.3 %    MCV 94.5 82.6 - 102.9 fL    MCH 32.1 25.2 - 33.5 pg    MCHC 34.0 28.4 - 34.8 g/dL    RDW 12.3 11.8 - 14.4 %    Platelets 303 138 - 453 k/uL    MPV 9.5 8.1 - 13.5 fL

## 2024-09-27 NOTE — PLAN OF CARE
Problem: Discharge Planning  Goal: Discharge to home or other facility with appropriate resources  Outcome: Progressing  Flowsheets (Taken 9/26/2024 2000)  Discharge to home or other facility with appropriate resources:   Identify barriers to discharge with patient and caregiver   Arrange for needed discharge resources and transportation as appropriate   Identify discharge learning needs (meds, wound care, etc)     Problem: Pain  Goal: Verbalizes/displays adequate comfort level or baseline comfort level  Outcome: Progressing     Problem: Safety - Adult  Goal: Free from fall injury  Outcome: Progressing

## 2024-09-27 NOTE — CARE COORDINATION
Case Management Assessment  Initial Evaluation    Date/Time of Evaluation: 9/27/2024 12:46 PM  Assessment Completed by: IRVING NUÑEZ RN    If patient is discharged prior to next notation, then this note serves as note for discharge by case management.    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 Yes    Chart Reviewed: Yes      History Provided by: Patient  Patient Orientation: Alert and Oriented    Patient Cognition: Alert    Hospitalization in the last 30 days (Readmission):  No    If yes, Readmission Assessment in CM Navigator will be completed.    Advance Directives:      Code Status: Full Code   Patient's Primary Decision Maker is: Legal Next of Kin      Discharge Planning:    Patient lives with: Spouse/Significant Other Type of Home: House  Primary Care Giver: Self  Patient Support Systems include: Spouse/Significant Other   Current Financial resources: Other (Comment) (BCBS)  Current community resources: None  Current services prior to admission: None            Current DME:              Type of Home Care services:  None    ADLS  Prior functional level: Independent in ADLs/IADLs  Current functional level: Independent in ADLs/IADLs    PT AM-PAC:   /24  OT AM-PAC:   /24    Family can provide assistance at DC: Yes  Would you like Case Management to discuss the discharge plan with any other family members/significant others, and if so, who? No  Plans to Return to Present Housing: Yes  Other Identified Issues/Barriers to RETURNING to current housing: pulmonary consult   Potential Assistance needed at discharge: N/A            Potential DME:    Patient expects to  discharge to: House  Plan for transportation at discharge: Family    Financial    Payor: BCBS / Plan: BCBS OUT OF STATE / Product Type: *No Product type* /     Does insurance require precert for SNF: Yes    Potential assistance Purchasing Medications: No  Meds-to-Beds request: Yes      [facility] [address] [place] [address] - [address] [facility] - P [phone] - F [phone] [address] [facility] [address]  Phone: [phone] Fax: [phone]      Notes:    Factors facilitating achievement of predicted outcomes: Family support, Motivated, Cooperative, Pleasant, and Sense of humor    Barriers to discharge: possible need for lung biopsy     Additional Case Management Notes:   Patient lives with wife in 1 story home. Independent. Drives. No DME    Admitted due to cavitating mass to lung. In rule out TB precautions. Discussed with attending and feels most likely an infection due to multiple exposures while moving family ( av, houses ext)     Both pulmonary and ID have been consulted.     The Plan for Transition of Care is related to the following treatment goals of Cavitating mass in right upper lung lobe [J98.4]  Lung infection [J18.9]    IF APPLICABLE: The Patient and/or patient representative Kyle and his family were provided with a choice of provider and agrees with the discharge plan. Freedom of choice list with basic dialogue that supports the patient's individualized plan of care/goals and shares the quality data associated with the providers was provided to: Patient   Patient Representative Name:       The Patient and/or Patient Representative Agree with the Discharge Plan? Yes    IRVING NUÑEZ RN  Case Management Department

## 2024-09-27 NOTE — PROGRESS NOTES
Transitions of Care Pharmacy Service   Medication Review    The patient's list of current home medications has been reviewed.     Source(s) of information: spoke to patient and sure scripts     Based on information provided by the above source(s), I have updated the patient's home med list as described below.       I changed or updated the following medications on the patient's home medication list:  Removed cyclobenzaprine (FLEXERIL) 5 MG tablet     Added None      Adjusted   dupilumab (DUPIXENT) 300 MG/2ML SOPN injection     Other Notes None            Please feel free to call me with any questions about this encounter. Thank you.    This note will be reviewed and co-signed by the Transitions of [facility] Pharmacist. The pharmacist will review inpatient orders and contact the physician about any discrepancies.    Christy Rose, pharmacy technician  Transitions [facility] Pharmacy Service  Phone:  [phone]  Fax: [phone]      Electronically signed by Christy Rose on 9/27/2024 at 11:13 AM       Prior to Admission medications    Medication Sig   dupilumab (DUPIXENT) 300 MG/2ML SOPN injection Inject 300mg SQ at every 2 weeks   ibuprofen (ADVIL;MOTRIN) 800 MG tablet Take 1 tablet by mouth 3 times daily (with meals)

## 2024-09-27 NOTE — CARE COORDINATION
Discharge planning    Spoke with attending and ID pending. Possible IV atb needed.  Did send over referral to [facility] to follow.

## 2024-09-27 NOTE — PLAN OF CARE
Pt A&Ox4 during shift independent and calls out appropriately when needing assistance. Has remained free from falls and injuries   Pt in Airborne precautions for TB rule out pt on room air SPO2 WNL plan for bronchoscopy Monday Quantiferon gold obtained today   Vital signs stable normal sinus on monitor   Pt receiving IV Zosyn for ABX therapy   Patient having pain on their right side rib cage and rates it a 7. Pain interventions includepillow support/positioning, regular rest periods, and medicate per physician recommendation/order. Patients goal for pain relief is 3. The need for pain and symptom management will be considered in the discharge planning process to ensure patients comfort.    Problem: Discharge Planning  Goal: Discharge to home or other facility with appropriate resources  Outcome: Progressing     Problem: Pain  Goal: Verbalizes/displays adequate comfort level or baseline comfort level  Outcome: Progressing     Problem: Safety - Adult  Goal: Free from fall injury  Outcome: Progressing

## 2024-09-27 NOTE — RT PROTOCOL NOTE
RT Inhaler-Nebulizer Bronchodilator Protocol Note    There is a bronchodilator order in the chart from a provider indicating to follow the RT Bronchodilator Protocol and there is an “Initiate RT Inhaler-Nebulizer Bronchodilator Protocol” order as well (see protocol at bottom of note).    CXR Findings:  No results found.    The findings from the last RT Protocol Assessment were as follows:   History Pulmonary Disease: Chronic pulmonary disease  Respiratory Pattern: Regular pattern and RR 12-20 bpm  Breath Sounds: Clear breath sounds  Cough: Strong, spontaneous, non-productive  Indication for Bronchodilator Therapy: None  Bronchodilator Assessment Score: 2    Aerosolized bronchodilator medication orders have been revised according to the RT Inhaler-Nebulizer Bronchodilator Protocol below.    Respiratory Therapist to perform RT Therapy Protocol Assessment initially then follow the protocol.  Repeat RT Therapy Protocol Assessment PRN for score 0-3 or on second treatment, BID, and PRN for scores above 3.    No Indications - adjust the frequency to every 6 hours PRN wheezing or bronchospasm, if no treatments needed after 48 hours then discontinue using Per Protocol order mode.     If indication present, adjust the RT bronchodilator orders based on the Bronchodilator Assessment Score as indicated below.  Use Inhaler orders unless patient has one or more of the following: on home nebulizer, not able to hold breath for 10 seconds, is not alert and oriented, cannot activate and use MDI correctly, or respiratory rate 25 breaths per minute or more, then use the equivalent nebulizer order(s) with same Frequency and PRN reasons based on the score.  If a patient is on this medication at home then do not decrease Frequency below that used at home.    0-3 - enter or revise RT bronchodilator order(s) to equivalent RT Bronchodilator order with Frequency of every 4 hours PRN for wheezing or increased work of breathing using Per  Protocol order mode.        4-6 - enter or revise RT Bronchodilator order(s) to two equivalent RT bronchodilator orders with one order with BID Frequency and one order with Frequency of every 4 hours PRN wheezing or increased work of breathing using Per Protocol order mode.        7-10 - enter or revise RT Bronchodilator order(s) to two equivalent RT bronchodilator orders with one order with TID Frequency and one order with Frequency of every 4 hours PRN wheezing or increased work of breathing using Per Protocol order mode.       11-13 - enter or revise RT Bronchodilator order(s) to one equivalent RT bronchodilator order with QID Frequency and an Albuterol order with Frequency of every 4 hours PRN wheezing or increased work of breathing using Per Protocol order mode.      Greater than 13 - enter or revise RT Bronchodilator order(s) to one equivalent RT bronchodilator order with every 4 hours Frequency and an Albuterol order with Frequency of every 2 hours PRN wheezing or increased work of breathing using Per Protocol order mode.     RT to enter RT Home Evaluation for COPD & MDI Assessment order using Per Protocol order mode.    Electronically signed by Sanjay Montalvo RCP on 9/26/2024 at 8:10 PM

## 2024-09-27 NOTE — CONSULTS
activity: Not on file   Other Topics Concern    Not on file   Social History Narrative    Not on file     Social Determinants of Health     Financial Resource Strain: Low Risk  (9/20/2024)    Overall Financial Resource Strain (CARDIA)     Difficulty of Paying Living Expenses: Not hard at all   Food Insecurity: No Food Insecurity (9/26/2024)    Hunger Vital Sign     Worried About Running Out of Food in the Last Year: Never true     Ran Out of Food in the Last Year: Never true   Transportation Needs: No Transportation Needs (9/26/2024)    PRAPARE - Transportation     Lack of Transportation (Medical): No     Lack of Transportation (Non-Medical): No   Physical Activity: Not on file   Stress: Not on file   Social Connections: Moderately Integrated (9/27/2024)    Social Connections ([facility] HRSN)     If for any reason you need help with day-to-day activities such as bathing, preparing meals, shopping, managing finances, etc., do you get the help you need?: Not on file   Intimate Partner Violence: Not on file   Housing Stability: Low Risk  (9/26/2024)    Housing Stability Vital Sign     Unable to Pay for Housing in the Last Year: No     Number of Times Moved in the Last Year: 1     Homeless in the Last Year: No     Family History          Problem Relation Age of Onset    Colon Cancer Mother     Lung Cancer Father     Other Cancer Father         bladder cancer    Cancer Maternal Grandmother     Heart Attack Maternal Grandfather     Cancer Paternal Grandmother     No Known Problems Paternal Grandfather      ROS - 11 systems   General Denies any fever or chills  HEENT Denies any diplopia, tinnitus or vertigo  Resp   Cardiac Denies any chest pain, palpitations, claudication or edema  GI Denies any melena, hematochezia, hematemesis or pyrosis   Denies any frequency, urgency, hesitancy or incontinence  Heme Denies bruising or bleeding easily  Endocrine Denies any history of diabetes or thyroid disease  Neuro Denies any focal motor  or sensory deficits  Psychiatric Denies anxiety, depression, suicidal ideation  Skin Denies rashes, itching, open sores    Vitals     weight is 62.4 kg (137 lb 9.6 oz). His oral temperature is 98.8 °F (37.1 °C). His blood pressure is 101/76 and his pulse is 81. His respiration is 17 and oxygen saturation is 94%.   Body mass index is 18.66 kg/m².  I/O      Intake/Output Summary (Last 24 hours) at 9/27/2024 1440  Last data filed at 9/27/2024 0636  Gross per 24 hour   Intake 644.28 ml   Output --   Net 644.28 ml     I/O last 3 completed shifts:  In: 644.3 [P.O.:375; I.V.:59.7; IV Piggyback:209.6]  Out: -    Patient Vitals for the past 96 hrs (Last 3 readings):   Weight   09/27/24 0447 62.4 kg (137 lb 9.6 oz)     Exam   General Appearance  Awake, alert, oriented, in no acute distress  HEENT - Head is normocephalic, atraumatic. Pupil reactive to light  Neck - Supple, symmetrical, trachea midline and Soft, trachea midline and straight  Lungs -   Cardiovascular - Heart sounds are normal.  Regular rhythm normal rate without murmur, gallop or rub.  Abdomen - Soft, nontender, nondistended, no masses or organomegaly  Neurologic - CN II-XII are grossly intact. There are no focal motor  deficits  Skin - No bruising or bleeding  Extremities - No cyanosis, clubbing or edema    Labs  - Old records and notes have been reviewed in [facility]   CBC     Lab Results   Component Value Date/Time    WBC 11.8 09/27/2024 05:51 AM    RBC 4.47 09/27/2024 05:51 AM    HGB 14.6 09/27/2024 05:51 AM    HCT 42.6 09/27/2024 05:51 AM     09/27/2024 05:51 AM    MCV 95.3 09/27/2024 05:51 AM    MCH 32.7 09/27/2024 05:51 AM    MCHC 34.3 09/27/2024 05:51 AM    RDW 12.2 09/27/2024 05:51 AM    LYMPHOPCT 12 09/27/2024 05:51 AM    MONOPCT 21 09/27/2024 05:51 AM    EOSPCT 2 09/27/2024 05:51 AM    BASOPCT 0 09/27/2024 05:51 AM    MONOSABS 2.48 09/27/2024 05:51 AM    LYMPHSABS 1.42 09/27/2024 05:51 AM    EOSABS 0.24 09/27/2024 05:51 AM    BASOSABS 0.00

## 2024-09-27 NOTE — CONSULTS
Syncytial Virus PCR Not Detected    Bordetella parapertussis by PCR Not Detected    B Pertussis by PCR Not Detected    Chlamydia pneumoniae By PCR Not Detected    Mycoplasma pneumo by PCR Not Detected    Comment: Performed by multiplexed nucleic acid assay.      Culture, Respiratory [[id number]]    Order Status: No result Specimen: Sputum Expectorated    Culture with Smear, Acid Fast Bacillius [[id number]]    Order Status: No result Specimen: Sputum Aspirated        Electronically signed by Eloy Morfin MD on 9/27/2024 at 6:01 PM      [facility]  Eloy Morfin MD  Perfect Serve messaging  OFFICE: [phone]    Thank you for allowing us to participate in the care of this patient. Please call with questions.     This note is created with the assistance of a speech recognition program.  While intending to generate a document that actually reflects the content of the visit, the document can still have some errors including those of syntax and sound a like substitutions which may escape proof reading.  In such instances, actual meaning can be extrapolated by contextual diversion.

## 2024-09-28 LAB
ANION GAP SERPL CALCULATED.3IONS-SCNC: 8 MMOL/L (ref 9–17)
BASOPHILS # BLD: 0 K/UL (ref 0–0.2)
BASOPHILS NFR BLD: 0 % (ref 0–2)
BUN SERPL-MCNC: 11 MG/DL (ref 6–20)
BUN/CREAT SERPL: 14 (ref 9–20)
CALCIUM SERPL-MCNC: 8.8 MG/DL (ref 8.6–10.4)
CHLORIDE SERPL-SCNC: 98 MMOL/L (ref 98–107)
CO2 SERPL-SCNC: 27 MMOL/L (ref 20–31)
CREAT SERPL-MCNC: 0.8 MG/DL (ref 0.7–1.2)
EOSINOPHIL # BLD: 0.21 K/UL (ref 0–0.44)
EOSINOPHILS RELATIVE PERCENT: 2 % (ref 1–4)
ERYTHROCYTE [DISTWIDTH] IN BLOOD BY AUTOMATED COUNT: 11.9 % (ref 11.8–14.4)
GFR, ESTIMATED: >90 ML/MIN/1.73M2
GLUCOSE SERPL-MCNC: 102 MG/DL (ref 70–99)
HCT VFR BLD AUTO: 37.9 % (ref 40.7–50.3)
HGB BLD-MCNC: 12.9 G/DL (ref 13–17)
IMM GRANULOCYTES # BLD AUTO: 0.1 K/UL (ref 0–0.3)
IMM GRANULOCYTES NFR BLD: 1 %
LYMPHOCYTES NFR BLD: 2.29 K/UL (ref 1.1–3.7)
LYMPHOCYTES RELATIVE PERCENT: 22 % (ref 24–43)
MCH RBC QN AUTO: 32.7 PG (ref 25.2–33.5)
MCHC RBC AUTO-ENTMCNC: 34 G/DL (ref 28.4–34.8)
MCV RBC AUTO: 95.9 FL (ref 82.6–102.9)
MONOCYTES NFR BLD: 1.98 K/UL (ref 0.1–1.2)
MONOCYTES NFR BLD: 19 % (ref 3–12)
NEUTROPHILS NFR BLD: 56 % (ref 36–65)
NEUTS SEG NFR BLD: 5.82 K/UL (ref 1.5–8.1)
NRBC BLD-RTO: 0 PER 100 WBC
PLATELET # BLD AUTO: 276 K/UL (ref 138–453)
PMV BLD AUTO: 9.4 FL (ref 8.1–13.5)
POTASSIUM SERPL-SCNC: 4.4 MMOL/L (ref 3.7–5.3)
RBC # BLD AUTO: 3.95 M/UL (ref 4.21–5.77)
SODIUM SERPL-SCNC: 133 MMOL/L (ref 135–144)
WBC OTHER # BLD: 10.4 K/UL (ref 3.5–11.3)

## 2024-09-28 PROCEDURE — 36415 COLL VENOUS BLD VENIPUNCTURE: CPT

## 2024-09-28 PROCEDURE — 85025 COMPLETE CBC W/AUTO DIFF WBC: CPT

## 2024-09-28 PROCEDURE — 80048 BASIC METABOLIC PNL TOTAL CA: CPT

## 2024-09-28 PROCEDURE — 1200000000 HC SEMI PRIVATE

## 2024-09-28 PROCEDURE — 99233 SBSQ HOSP IP/OBS HIGH 50: CPT | Performed by: INTERNAL MEDICINE

## 2024-09-28 PROCEDURE — 99232 SBSQ HOSP IP/OBS MODERATE 35: CPT | Performed by: INTERNAL MEDICINE

## 2024-09-28 PROCEDURE — 2580000003 HC RX 258: Performed by: NURSE PRACTITIONER

## 2024-09-28 PROCEDURE — 6360000002 HC RX W HCPCS: Performed by: NURSE PRACTITIONER

## 2024-09-28 PROCEDURE — 6370000000 HC RX 637 (ALT 250 FOR IP): Performed by: INTERNAL MEDICINE

## 2024-09-28 RX ORDER — IBUPROFEN 800 MG/1
800 TABLET, FILM COATED ORAL EVERY 8 HOURS PRN
Status: DISCONTINUED | OUTPATIENT
Start: 2024-09-28 | End: 2024-10-02 | Stop reason: HOSPADM

## 2024-09-28 RX ORDER — OXYCODONE HYDROCHLORIDE 5 MG/1
10 TABLET ORAL EVERY 4 HOURS PRN
Status: DISCONTINUED | OUTPATIENT
Start: 2024-09-28 | End: 2024-10-02 | Stop reason: HOSPADM

## 2024-09-28 RX ORDER — ACETAMINOPHEN 650 MG/1
650 SUPPOSITORY RECTAL EVERY 6 HOURS PRN
Status: DISCONTINUED | OUTPATIENT
Start: 2024-09-28 | End: 2024-10-02 | Stop reason: HOSPADM

## 2024-09-28 RX ORDER — OXYCODONE HYDROCHLORIDE 5 MG/1
5 TABLET ORAL EVERY 4 HOURS PRN
Status: DISCONTINUED | OUTPATIENT
Start: 2024-09-28 | End: 2024-10-02 | Stop reason: HOSPADM

## 2024-09-28 RX ORDER — ACETAMINOPHEN 325 MG/1
650 TABLET ORAL EVERY 6 HOURS PRN
Status: DISCONTINUED | OUTPATIENT
Start: 2024-09-28 | End: 2024-10-02 | Stop reason: HOSPADM

## 2024-09-28 RX ADMIN — PIPERACILLIN AND TAZOBACTAM 3375 MG: 3; .375 INJECTION, POWDER, LYOPHILIZED, FOR SOLUTION INTRAVENOUS at 23:19

## 2024-09-28 RX ADMIN — OXYCODONE HYDROCHLORIDE 10 MG: 5 TABLET ORAL at 08:23

## 2024-09-28 RX ADMIN — PIPERACILLIN AND TAZOBACTAM 3375 MG: 3; .375 INJECTION, POWDER, LYOPHILIZED, FOR SOLUTION INTRAVENOUS at 06:39

## 2024-09-28 RX ADMIN — SODIUM CHLORIDE, PRESERVATIVE FREE 10 ML: 5 INJECTION INTRAVENOUS at 08:08

## 2024-09-28 RX ADMIN — LINEZOLID 600 MG: 600 TABLET, FILM COATED ORAL at 20:31

## 2024-09-28 RX ADMIN — SODIUM CHLORIDE, PRESERVATIVE FREE 10 ML: 5 INJECTION INTRAVENOUS at 20:32

## 2024-09-28 RX ADMIN — OXYCODONE HYDROCHLORIDE 5 MG: 5 TABLET ORAL at 03:06

## 2024-09-28 RX ADMIN — LINEZOLID 600 MG: 600 TABLET, FILM COATED ORAL at 08:08

## 2024-09-28 RX ADMIN — ENOXAPARIN SODIUM 40 MG: 100 INJECTION SUBCUTANEOUS at 08:08

## 2024-09-28 RX ADMIN — SODIUM CHLORIDE: 9 INJECTION, SOLUTION INTRAVENOUS at 06:36

## 2024-09-28 RX ADMIN — OXYCODONE HYDROCHLORIDE 10 MG: 5 TABLET ORAL at 20:31

## 2024-09-28 RX ADMIN — PIPERACILLIN AND TAZOBACTAM 3375 MG: 3; .375 INJECTION, POWDER, LYOPHILIZED, FOR SOLUTION INTRAVENOUS at 15:15

## 2024-09-28 RX ADMIN — OXYCODONE HYDROCHLORIDE 5 MG: 5 TABLET ORAL at 14:17

## 2024-09-28 ASSESSMENT — PAIN SCALES - GENERAL
PAINLEVEL_OUTOF10: 3
PAINLEVEL_OUTOF10: 4
PAINLEVEL_OUTOF10: 5
PAINLEVEL_OUTOF10: 8
PAINLEVEL_OUTOF10: 7
PAINLEVEL_OUTOF10: 0
PAINLEVEL_OUTOF10: 3
PAINLEVEL_OUTOF10: 6

## 2024-09-28 ASSESSMENT — PAIN DESCRIPTION - DESCRIPTORS
DESCRIPTORS: SORE;TENDER
DESCRIPTORS: DULL;PENETRATING
DESCRIPTORS: ACHING;SORE
DESCRIPTORS: NAGGING;DULL

## 2024-09-28 ASSESSMENT — PAIN DESCRIPTION - LOCATION
LOCATION: RIB CAGE
LOCATION: RIB CAGE
LOCATION: FLANK
LOCATION: RIB CAGE

## 2024-09-28 ASSESSMENT — PAIN DESCRIPTION - ORIENTATION
ORIENTATION: RIGHT;LOWER
ORIENTATION: RIGHT;UPPER
ORIENTATION: RIGHT
ORIENTATION: RIGHT

## 2024-09-28 ASSESSMENT — PAIN DESCRIPTION - PAIN TYPE
TYPE: ACUTE PAIN
TYPE: ACUTE PAIN

## 2024-09-28 ASSESSMENT — PAIN - FUNCTIONAL ASSESSMENT
PAIN_FUNCTIONAL_ASSESSMENT: PREVENTS OR INTERFERES SOME ACTIVE ACTIVITIES AND ADLS
PAIN_FUNCTIONAL_ASSESSMENT: ACTIVITIES ARE NOT PREVENTED

## 2024-09-28 ASSESSMENT — PAIN DESCRIPTION - FREQUENCY
FREQUENCY: INTERMITTENT
FREQUENCY: INTERMITTENT

## 2024-09-28 ASSESSMENT — PAIN DESCRIPTION - ONSET
ONSET: ON-GOING
ONSET: ON-GOING

## 2024-09-28 NOTE — PLAN OF CARE
Pt has been resting comfortably in his bed. He does get up and ambulates independently in his bed. Pt still complaining of pain the the right rib cage area, See MAR for meds given. Pt remains on IV antibiotics for pneumonia., Lung sounds are clean no additional oxygen needed. All questions and concerns have been addressed. Bed is locked and in the lowest position with the call light in reach. Safety maintained.      Problem: Discharge Planning  Goal: Discharge to home or other facility with appropriate resources  Outcome: Progressing  Flowsheets (Taken 9/28/2024 0800)  Discharge to home or other facility with appropriate resources: Identify barriers to discharge with patient and caregiver     Problem: Pain  Goal: Verbalizes/displays adequate comfort level or baseline comfort level  Outcome: Progressing  Patient having pain on their right rib cage area and rates it a 7. Pain interventions includefrequent position changes, correct body alignment/body mechanics, relaxation techniques, medication, and regular rest periods. Patients goal for pain relief is 4. The need for pain and symptom management will be considered in the discharge planning process to ensure patients comfort.       Problem: Safety - Adult  Goal: Free from fall injury  Outcome: Progressing

## 2024-09-28 NOTE — PROGRESS NOTES
[facility]  Office: [phone]  Riley Weaver, DO, Brendan Smith, DO, Kvng Tinsley DO, Bao Weiss, DO, Beronica Almaraz MD, Haylee Guajardo MD, Radha Lee MD, Hope Oliva MD,  Jose Mari MD, Milagro Turner MD, Diamond Shepherd MD,  Dafne Gaona DO, Meliton Sun MD, Brent Serrano MD, Rod Weaver DO, Tabitha Harris MD,  Hernesto Ly DO, Paula Cramer MD, Shana Diego MD, Terese Zhong MD, Marjorie Granda MD,  Reggie Morley MD, Ronald Flores MD, Frida Gomez MD, Saida Hall MD, Boyd Aquino MD, Glen Ace MD, Rigoberto Guo, DO, Khoi Fonseca DO, Derian Hunter DO, Ady Esparza MD, Shirley Waterhouse, CNP,  Marely Crowell CNP, Rigoberto Hanson, CNP,  Joyce Elise, DNP, Estefany Mckenzie, CNP, Chetna Arita, CNP, Nathaly Lange, CNP, Michela Lira, CNP, Courtney Carroll, PA-C, Rosalie Colón, PA-C, Adele Gudino, CNP, Nicol Molina, CNP,  Malinda Du, CNP, Reyna Farias, CNP, Emily Diaz, CNP, Christa Burnett, CNS, Jessica Blanco, CNP, Barbara Klein, CNP, Tracy Schwab, CNP         [facility]   IN-PATIENT SERVICE   [facility]    Progress Note    9/28/2024    9:53 AM    Name:   Kyle Guzman  MRN:     [id number]     Acct:      [id number]   Room:   [address] [place] [address] Day:  2  Admit Date:  9/26/2024  6:22 PM    PCP:   Sally Donis DO  Code Status:  Full Code    Subjective:     C/C: Chest wall pain, night sweats, cavitary lung lesion    Interval History Status: Improved.     Patient is resting in bed, denies any fevers, chills, night sweats or other acute complaints.  Denies any shortness of breath, chest pain, cough or skin rash.  Continues to have right-sided pleuritic/rib pain    Brief History:     This is a 53-year-old male that presents with a approximately 7 to 8-day history of right sided rib and chest wall pain.  Worsened with movement and deep inspiration.  He was seen in urgent care approximate 4 days ago with a chest x-ray     Lung infection 9/26/2024 Yes    COPD without exacerbation (HCC) 9/27/2024 Yes    Arthritis 9/26/2024 Yes    Immunocompromised status associated with infection (HCC) 9/27/2024 Yes       Plan:        Continue Zosyn and Zyvox per ID recommendations  Aerosol protocol  Oxygen as needed  GI and DVT prophylaxis  Activity as tolerated  Await cultures    Kvng Tinsley DO  9/28/2024  9:53 AM

## 2024-09-28 NOTE — PROGRESS NOTES
[facility]  Initial Consult Note  Date: 9/28/2024    Hospital day :2     Impression:   Right lung apical consolidative nodular density with central cavitation and there is a halo of patchy groundglass attenuation up to 1.3 cm thickness  COPD/emphysema  Eczema on Dupixent    Recommendations   Continue Zosyn and Zyvox  Histoplasma and Aspergillus serology pending  QuantiFERON TB Gold pending  Bronchoscopy     Chief complaint/reason for consultation:     Question Answer   Reason for Consult? lung abscess     History of Present Illness:   Kyle Guzman is a 53 y.o.-year-old male who was initially admitted on 9/26/2024.   Kyle is seen and evaluated at bedside and has COPD, eczema for which she has been on Dupixent for over a year over a year.    The patient reports that starting about a week and a half ago he started having some right-sided pleurisy without any significant cough and he has been working on clearing out a barn for close to 2 months now and he thought he had pulled a muscle.    The patient went to an urgent care center last week and had a chest x-ray done that did not show any significant findings and the thought was that the patient had a musculoskeletal problem.  The patient then reports he started having fevers, chills, worsening pleurisy went to see his primary care physician had testing done and he has been found to have a cavitary lesion for which she has been admitted and have been asked to evaluate and help with antibiotic choice.    Interval history 9/28/2024  He is afebrile, complaining of mild right-sided chest pain, improved with pain medication, denied significant cough, denied fever or chills, no other complaints.  I have personally reviewed the past medical history, past surgical history, medications, social history, and family history, and I have updated the database accordingly.  Past Medical History:     Past Medical History:   Diagnosis Date    Arthritis     COPD

## 2024-09-28 NOTE — PLAN OF CARE
Patient is A&Ox4, is on room air, normal sinus on monitor, and gets up independently. Patient calls out appropriately.    Patient is on airborne precautions for TB rule out.   Quantiferon gold obtained 9/27  Patient complaining of pain, given arnold, see MAR.   Patient is on IV Zosyn Q8hr and started on Zyvox Q12 today.  Safety maintained throughout shift, bed in lowest position, call light within reach, hourly rounding continued.     Patient having pain on their right rib cage and rates it a 5. Pain interventions include medication. Patients goal for pain relief is 1. The need for pain and symptom management will be considered in the discharge planning process to ensure patients comfort.     Problem: Discharge Planning  Goal: Discharge to home or other facility with appropriate resources  9/28/2024 0029 by Camila Barone RN  Outcome: Progressing     Problem: Pain  Goal: Verbalizes/displays adequate comfort level or baseline comfort level  9/28/2024 0029 by Camila Barone, RN  Outcome: Progressing     Problem: Safety - Adult  Goal: Free from fall injury  9/28/2024 0029 by Camila Barone, RN  Outcome: Progressing     Problem: Infection - Adult  Goal: Absence of infection at discharge  Outcome: Progressing     Problem: Infection - Adult  Goal: Absence of infection during hospitalization  Outcome: Progressing     Problem: Infection - Adult  Goal: Absence of fever/infection during anticipated neutropenic period  Outcome: Progressing

## 2024-09-28 NOTE — PROGRESS NOTES
PULMONARY PROGRESS NOTE:    Interval History:    Shortness of Breath: no  Cough: no  Sputum: no  Hemoptysis: no  Chest Pain: no  Fever: no  Swelling Feet: no  Headache: no  Nausea, Emesis, Abdominal Pain: no  Diarrhea: no  Constipation:no  Events since last visit: none    PAST MEDICAL HISTORY:    Smoking: yes    PHYSICAL EXAMINATION:  /84   Pulse 74   Temp 98.1 °F (36.7 °C) (Oral)   Resp 18   Wt 62.5 kg (137 lb 12.8 oz)   SpO2 97%   BMI 18.69 kg/m²   General : Awake, alert, oriented to time, place, and person, room air  Neck - supple, no lymphadenopathy, JVD not raised  Heart - regular rhythm, S1 and S2 normal; no additional sounds heard  Lungs - Air Entry- fair bilaterally; breath sounds : vesicular  Abdomen - soft, no tenderness  Upper Extremities  - no cyanosis, mottling; edema : absent  Lower Extremities: no cyanosis, mottling; edema : absent    Current Laboratory, Radiologic, Microbiologic, and Diagnostic studies reviewed    ASSESSMENT / PLAN:    Cavitating right upper lobe pneumonia new since recent x-ray doubt tuberculosis/atypical chest pain  Zosyn IV  Sputum culture  Check QuantiFERON test  ID on consult  no need for IR guided biopsy  Follow-up CT chest outpatient  ID requesting bronchoscopy  Bronch tentatively planned for 9-30-24     COPD/emphysema/smoker  DuoNeb as needed  Symbicort  Discontinue prednisone oral  Smoking cessation advised  PFT outpatient     DVT prophylaxis    This is a late note on patient seen by me earlier today.  Electronically signed by Shankar Abreu MD on 09/28/24 at 6:47 PM

## 2024-09-29 LAB
ANION GAP SERPL CALCULATED.3IONS-SCNC: 11 MMOL/L (ref 9–17)
ASPERGILLUS GALACTO AG: NEGATIVE
BASOPHILS # BLD: 0 K/UL (ref 0–0.2)
BASOPHILS NFR BLD: 0 %
BUN SERPL-MCNC: 10 MG/DL (ref 6–20)
BUN/CREAT SERPL: 11 (ref 9–20)
CALCIUM SERPL-MCNC: 8.7 MG/DL (ref 8.6–10.4)
CHLORIDE SERPL-SCNC: 99 MMOL/L (ref 98–107)
CO2 SERPL-SCNC: 26 MMOL/L (ref 20–31)
CREAT SERPL-MCNC: 0.9 MG/DL (ref 0.7–1.2)
EOSINOPHIL # BLD: 0.22 K/UL (ref 0–0.4)
EOSINOPHILS RELATIVE PERCENT: 2 % (ref 1–4)
ERYTHROCYTE [DISTWIDTH] IN BLOOD BY AUTOMATED COUNT: 12 % (ref 11.8–14.4)
GALACTOMANNAN AG SPEC IA-ACNC: 0.04
GFR, ESTIMATED: >90 ML/MIN/1.73M2
GLUCOSE SERPL-MCNC: 86 MG/DL (ref 70–99)
HCT VFR BLD AUTO: 38.7 % (ref 40.7–50.3)
HGB BLD-MCNC: 13.2 G/DL (ref 13–17)
IMM GRANULOCYTES # BLD AUTO: 0.11 K/UL (ref 0–0.3)
IMM GRANULOCYTES NFR BLD: 1 %
LYMPHOCYTES NFR BLD: 2.22 K/UL (ref 1–4.8)
LYMPHOCYTES RELATIVE PERCENT: 20 % (ref 24–44)
MCH RBC QN AUTO: 32.7 PG (ref 25.2–33.5)
MCHC RBC AUTO-ENTMCNC: 34.1 G/DL (ref 28.4–34.8)
MCV RBC AUTO: 95.8 FL (ref 82.6–102.9)
MONOCYTES NFR BLD: 1.89 K/UL (ref 0.2–0.8)
MONOCYTES NFR BLD: 17 % (ref 1–7)
NEUTROPHILS NFR BLD: 60 % (ref 36–66)
NEUTS SEG NFR BLD: 6.66 K/UL (ref 1.8–7.7)
NRBC BLD-RTO: 0 PER 100 WBC
PLATELET # BLD AUTO: 336 K/UL (ref 138–453)
PMV BLD AUTO: 9.3 FL (ref 8.1–13.5)
POTASSIUM SERPL-SCNC: 4.2 MMOL/L (ref 3.7–5.3)
QUANTI TB GOLD PLUS: NEGATIVE
QUANTI TB1 MINUS NIL: 0 IU/ML
QUANTI TB2 MINUS NIL: 0 IU/ML
QUANTIFERON MITOGEN: 6.48 IU/ML
QUANTIFERON NIL: 0 IU/ML
RBC # BLD AUTO: 4.04 M/UL (ref 4.21–5.77)
SODIUM SERPL-SCNC: 136 MMOL/L (ref 135–144)
WBC OTHER # BLD: 11.1 K/UL (ref 3.5–11.3)

## 2024-09-29 PROCEDURE — 6370000000 HC RX 637 (ALT 250 FOR IP): Performed by: NURSE PRACTITIONER

## 2024-09-29 PROCEDURE — 6370000000 HC RX 637 (ALT 250 FOR IP): Performed by: INTERNAL MEDICINE

## 2024-09-29 PROCEDURE — 2580000003 HC RX 258: Performed by: NURSE PRACTITIONER

## 2024-09-29 PROCEDURE — 85025 COMPLETE CBC W/AUTO DIFF WBC: CPT

## 2024-09-29 PROCEDURE — 6360000002 HC RX W HCPCS: Performed by: NURSE PRACTITIONER

## 2024-09-29 PROCEDURE — 36415 COLL VENOUS BLD VENIPUNCTURE: CPT

## 2024-09-29 PROCEDURE — 2580000003 HC RX 258: Performed by: INTERNAL MEDICINE

## 2024-09-29 PROCEDURE — 1200000000 HC SEMI PRIVATE

## 2024-09-29 PROCEDURE — 80048 BASIC METABOLIC PNL TOTAL CA: CPT

## 2024-09-29 PROCEDURE — 99232 SBSQ HOSP IP/OBS MODERATE 35: CPT | Performed by: INTERNAL MEDICINE

## 2024-09-29 RX ORDER — SODIUM CHLORIDE 9 MG/ML
INJECTION, SOLUTION INTRAVENOUS CONTINUOUS
Status: DISCONTINUED | OUTPATIENT
Start: 2024-09-30 | End: 2024-10-02 | Stop reason: HOSPADM

## 2024-09-29 RX ORDER — LIDOCAINE 4 G/G
1 PATCH TOPICAL DAILY
Status: DISCONTINUED | OUTPATIENT
Start: 2024-09-29 | End: 2024-10-02 | Stop reason: HOSPADM

## 2024-09-29 RX ADMIN — LINEZOLID 600 MG: 600 TABLET, FILM COATED ORAL at 09:18

## 2024-09-29 RX ADMIN — OXYCODONE HYDROCHLORIDE 10 MG: 5 TABLET ORAL at 11:59

## 2024-09-29 RX ADMIN — PIPERACILLIN AND TAZOBACTAM 3375 MG: 3; .375 INJECTION, POWDER, LYOPHILIZED, FOR SOLUTION INTRAVENOUS at 16:28

## 2024-09-29 RX ADMIN — OXYCODONE HYDROCHLORIDE 10 MG: 5 TABLET ORAL at 23:48

## 2024-09-29 RX ADMIN — PIPERACILLIN AND TAZOBACTAM 3375 MG: 3; .375 INJECTION, POWDER, LYOPHILIZED, FOR SOLUTION INTRAVENOUS at 23:52

## 2024-09-29 RX ADMIN — OXYCODONE HYDROCHLORIDE 10 MG: 5 TABLET ORAL at 05:10

## 2024-09-29 RX ADMIN — PIPERACILLIN AND TAZOBACTAM 3375 MG: 3; .375 INJECTION, POWDER, LYOPHILIZED, FOR SOLUTION INTRAVENOUS at 09:24

## 2024-09-29 RX ADMIN — ENOXAPARIN SODIUM 40 MG: 100 INJECTION SUBCUTANEOUS at 09:17

## 2024-09-29 RX ADMIN — SODIUM CHLORIDE: 9 INJECTION, SOLUTION INTRAVENOUS at 23:53

## 2024-09-29 RX ADMIN — LINEZOLID 600 MG: 600 TABLET, FILM COATED ORAL at 20:56

## 2024-09-29 RX ADMIN — SODIUM CHLORIDE, PRESERVATIVE FREE 10 ML: 5 INJECTION INTRAVENOUS at 20:56

## 2024-09-29 RX ADMIN — OXYCODONE HYDROCHLORIDE 10 MG: 5 TABLET ORAL at 18:21

## 2024-09-29 ASSESSMENT — PAIN DESCRIPTION - ORIENTATION
ORIENTATION: RIGHT
ORIENTATION: RIGHT;UPPER
ORIENTATION: RIGHT
ORIENTATION: RIGHT

## 2024-09-29 ASSESSMENT — PAIN - FUNCTIONAL ASSESSMENT
PAIN_FUNCTIONAL_ASSESSMENT: ACTIVITIES ARE NOT PREVENTED
PAIN_FUNCTIONAL_ASSESSMENT: PREVENTS OR INTERFERES SOME ACTIVE ACTIVITIES AND ADLS
PAIN_FUNCTIONAL_ASSESSMENT: ACTIVITIES ARE NOT PREVENTED

## 2024-09-29 ASSESSMENT — PAIN SCALES - GENERAL
PAINLEVEL_OUTOF10: 7
PAINLEVEL_OUTOF10: 8
PAINLEVEL_OUTOF10: 4

## 2024-09-29 ASSESSMENT — PAIN DESCRIPTION - LOCATION
LOCATION: FLANK
LOCATION: RIB CAGE

## 2024-09-29 ASSESSMENT — PAIN DESCRIPTION - FREQUENCY: FREQUENCY: INTERMITTENT

## 2024-09-29 ASSESSMENT — PAIN DESCRIPTION - DESCRIPTORS
DESCRIPTORS: THROBBING;SHARP
DESCRIPTORS: ACHING;DULL
DESCRIPTORS: ACHING;DULL
DESCRIPTORS: DULL;PENETRATING

## 2024-09-29 ASSESSMENT — PAIN DESCRIPTION - ONSET: ONSET: ON-GOING

## 2024-09-29 ASSESSMENT — PAIN DESCRIPTION - PAIN TYPE: TYPE: ACUTE PAIN

## 2024-09-29 NOTE — PROGRESS NOTES
smokeless tobacco. He reports current alcohol use. He reports that he does not use drugs.     Family History:   Family History   Problem Relation Age of Onset    Colon Cancer Mother     Lung Cancer Father     Other Cancer Father         bladder cancer    Cancer Maternal Grandmother     Heart Attack Maternal Grandfather     Cancer Paternal Grandmother     No Known Problems Paternal Grandfather        Vitals:  /78   Pulse 75   Temp 98.7 °F (37.1 °C) (Oral)   Resp 16   Wt 61.9 kg (136 lb 6.4 oz)   SpO2 97%   BMI 18.50 kg/m²   Temp (24hrs), Av.2 °F (36.8 °C), Min:97.9 °F (36.6 °C), Max:98.7 °F (37.1 °C)    Recent Labs     24  0917 24  1206 24  1720   POCGLU 104 110 292*       I/O (24Hr):    Intake/Output Summary (Last 24 hours) at 2024 1012  Last data filed at 2024 0604  Gross per 24 hour   Intake 505.4 ml   Output --   Net 505.4 ml       Labs:  Hematology:  Recent Labs     24  1023 24  0551 24  0449 24  0455   WBC 10.4 11.8* 10.4 11.1   RBC 4.92 4.47 3.95* 4.04*   HGB 15.8 14.6 12.9* 13.2   HCT 46.5 42.6 37.9* 38.7*   MCV 94.5 95.3 95.9 95.8   MCH 32.1 32.7 32.7 32.7   MCHC 34.0 34.3 34.0 34.1   RDW 12.3 12.2 11.9 12.0    271 276 336   MPV 9.5 9.5 9.4 9.3   DDIMER 1.10*  --   --   --      Chemistry:  Recent Labs     24  0551 24  0449 24  0455    133* 136   K 4.4 4.4 4.2   CL 99 98 99   CO2 27 27 26   GLUCOSE 105* 102* 86   BUN 9 11 10   CREATININE 0.9 0.8 0.9   ANIONGAP 9 8* 11   LABGLOM >90 >90 >90   CALCIUM 8.8 8.8 8.7     Recent Labs     24  1023 24  0917 24  1206 24  1720   AST 18  --   --   --    ALT 13  --   --   --    ALKPHOS 54  --   --   --    BILITOT 0.6  --   --   --    POCGLU  --  104 110 292*     ABG:No results found for: \"POCPH\", \"PHART\", \"PH\", \"POCPCO2\", \"OQX1QSV\", \"PCO2\", \"POCPO2\", \"PO2ART\", \"PO2\", \"POCHCO3\", \"UWO7OAE\", \"HCO3\", \"NBEA\", \"PBEA\", \"BEART\", \"BE\", \"THGBART\", \"THB\",  \"DTE7FUK\", \"OSTU4IHW\", \"O9XVVPKI\", \"O2SAT\", \"FIO2\"  No results found for: \"SPECIAL\"  Lab Results   Component Value Date/Time    CULTURE NO GROWTH 1 DAY 09/27/2024 12:40 PM    CULTURE NO GROWTH 1 DAY 09/27/2024 12:40 PM       Radiology:  CT CHEST WO CONTRAST    Result Date: 9/26/2024  CHEST: 1. Interval development of a 3.3 cm right lung apex solid consolidative appearing nodular density with central cavitation or bleb there is a Halo of patchy ground-glass attenuation up to 1.3 cm thickness. Findings not present on chest x-ray of 09/20/2024 and would support infection rather than malignancy.  Follow-up after therapy advised to confirm resolution and exclude less likely differential of malignancy.. 2. Emphysematous changes mainly in the upper lobes. 3. No acute intra-abdominal or pelvic process.     CT ABDOMEN W CONTRAST Additional Contrast? None    Result Date: 9/26/2024  CHEST: 1. Interval development of a 3.3 cm right lung apex solid consolidative appearing nodular density with central cavitation or bleb there is a Halo of patchy ground-glass attenuation up to 1.3 cm thickness. Findings not present on chest x-ray of 09/20/2024 and would support infection rather than malignancy.  Follow-up after therapy advised to confirm resolution and exclude less likely differential of malignancy.. 2. Emphysematous changes mainly in the upper lobes. 3. No acute intra-abdominal or pelvic process.       Physical Examination:        General appearance:  alert, cooperative and no distress  Mental Status:  oriented to person, place and time and normal affect  Lungs:  clear to auscultation bilaterally, normal effort, diminished throughout  Heart:  regular rate and rhythm, no murmur  Abdomen:  soft, nontender, nondistended, normal bowel sounds, no masses, hepatomegaly, splenomegaly  Extremities:  no edema, redness, tenderness in the calves  Skin:  no gross lesions, rashes, induration    Assessment:        Hospital Problems

## 2024-09-29 NOTE — PLAN OF CARE
Pt has been resting comfortably in his room for most of the day. He is still complaining of pain to the right rib cage, rating it at a 7/10, see MAR for medications given. Plan is for pt to be NPO for a bronchoscopy tomorrow. Pt receiving IV antibiotics. All questions and concerns have been addressed. Bed is locked and in the lowest position with the call light in reach. Safety maintained.     Problem: Discharge Planning  Goal: Discharge to home or other facility with appropriate resources  Outcome: Progressing  Flowsheets (Taken 9/29/2024 0800)  Discharge to home or other facility with appropriate resources: Identify barriers to discharge with patient and caregiver     Problem: Pain  Goal: Verbalizes/displays adequate comfort level or baseline comfort level  Outcome: Progressing  Patient having pain on their right rib cage area and rates it a 7. Pain interventions includefrequent position changes, correct body alignment/body mechanics, relaxation techniques, medication, and regular rest periods. Patients goal for pain relief is 4. The need for pain and symptom management will be considered in the discharge planning process to ensure patients comfort.       Problem: Safety - Adult  Goal: Free from fall injury  Outcome: Progressing     Problem: Infection - Adult  Goal: Absence of infection at discharge  Recent Flowsheet Documentation  Taken 9/29/2024 0800 by Cyn Dhillon RN  Absence of infection at discharge: Assess and monitor for signs and symptoms of infection

## 2024-09-29 NOTE — PROGRESS NOTES
PULMONARY PROGRESS NOTE:    Interval History:cavitary lesion    Shortness of Breath: no  Cough: no  Sputum: no  Hemoptysis: no  Chest Pain: rt side chest wall pain  Fever: no  Swelling Feet: no  Headache: no  Nausea, Emesis, Abdominal Pain: no  Diarrhea: no  Constipation:no  Events since last visit: none    PAST MEDICAL HISTORY:    Smoking: yes    PHYSICAL EXAMINATION:  /78   Pulse 75   Temp 98.7 °F (37.1 °C) (Oral)   Resp 16   Wt 61.9 kg (136 lb 6.4 oz)   SpO2 97%   BMI 18.50 kg/m²   General : Awake, alert, oriented to time, place, and person, room air, up walking in room - guarding to rt side  Neck - supple, no lymphadenopathy, JVD not raised  Heart - regular rhythm, S1 and S2 normal; no additional sounds heard  Lungs - Air Entry- fair bilaterally; breath sounds : vesicular  Abdomen - soft, no tenderness  Upper Extremities  - no cyanosis, mottling; edema : absent  Lower Extremities: no cyanosis, mottling; edema : absent    Current Laboratory, Radiologic, Microbiologic, and Diagnostic studies reviewed    ASSESSMENT / PLAN:    Cavitating right upper lobe pneumonia new since recent x-ray doubt tuberculosis/atypical chest pain  Zosyn IV  Sputum culture - has cup at bedside, not bringing up anything yet  BCx no growth x 1 d  Check QuantiFERON test  ID on consult  no need for IR guided biopsy  Follow-up CT chest outpatient  ID requesting bronchoscopy  Bronch tentatively planned for 9-30-24, NPO after MN  Lidoderm patch to rt chest wall     COPD/emphysema/smoker  DuoNeb as needed  Symbicort  Smoking cessation advised  PFT outpatient     DVT prophylaxis    Plan of care discussed with Dr Abreu  Electronically signed by DANYEL WHEELER CNP on 09/29/24 at 9:55 AM

## 2024-09-29 NOTE — PLAN OF CARE
Kyle resting well at this time with no s/s or c/o pain. Gave PRN Oxcodone at HS to fair effect. Pt is afebrile on ABT therapy for pneumonia with no s/s of adverse reaction noted. Lung sounds diminished, and he is stable on room air. He is up ad haily and tolerates activity well. Pt has call light in reach and is using appropriately; safety maintained.    Problem: Pain  Goal: Verbalizes/displays adequate comfort level or baseline comfort level  9/29/2024 0028 by Keily Bruno RN  Outcome: Progressing  Flowsheets (Taken 9/28/2024 2031)  Verbalizes/displays adequate comfort level or baseline comfort level:   Encourage patient to monitor pain and request assistance   Assess pain using appropriate pain scale   Administer analgesics based on type and severity of pain and evaluate response   Patient having pain on their R flank and rates it a 8. Pain interventions include medication. Patients goal for pain relief is  no pain . The need for pain and symptom management will be considered in the discharge planning process to ensure patients comfort.     Problem: Infection - Adult  Goal: Absence of infection at discharge  Outcome: Progressing  Flowsheets (Taken 9/28/2024 2030)  Absence of infection at discharge:   Assess and monitor for signs and symptoms of infection   Monitor lab/diagnostic results   Monitor all insertion sites i.e., indwelling lines, tubes and drains   Administer medications as ordered   Identify and instruct in appropriate isolation precautions for identified infection/condition     Problem: Respiratory - Adult  Goal: Achieves optimal ventilation and oxygenation  Outcome: Progressing  Flowsheets (Taken 9/28/2024 2030)  Achieves optimal ventilation and oxygenation:   Assess for changes in respiratory status   Assess for changes in mentation and behavior   Position to facilitate oxygenation and minimize respiratory effort     Problem: Safety - Adult  Goal: Free from fall injury  9/29/2024 0028 by Kiana

## 2024-09-30 ENCOUNTER — ANESTHESIA (OUTPATIENT)
Dept: OPERATING ROOM | Age: 54
DRG: 178 | End: 2024-09-30
Payer: COMMERCIAL

## 2024-09-30 ENCOUNTER — ANESTHESIA EVENT (OUTPATIENT)
Dept: OPERATING ROOM | Age: 54
DRG: 178 | End: 2024-09-30
Payer: COMMERCIAL

## 2024-09-30 LAB
ANION GAP SERPL CALCULATED.3IONS-SCNC: 8 MMOL/L (ref 9–17)
BASOPHILS # BLD: 0.04 K/UL (ref 0–0.2)
BASOPHILS NFR BLD: 1 % (ref 0–2)
BUN SERPL-MCNC: 8 MG/DL (ref 6–20)
BUN/CREAT SERPL: 9 (ref 9–20)
CALCIUM SERPL-MCNC: 8.7 MG/DL (ref 8.6–10.4)
CHLORIDE SERPL-SCNC: 102 MMOL/L (ref 98–107)
CO2 SERPL-SCNC: 26 MMOL/L (ref 20–31)
CREAT SERPL-MCNC: 0.9 MG/DL (ref 0.7–1.2)
EOSINOPHIL # BLD: 0.15 K/UL (ref 0–0.44)
EOSINOPHILS RELATIVE PERCENT: 2 % (ref 1–4)
ERYTHROCYTE [DISTWIDTH] IN BLOOD BY AUTOMATED COUNT: 12 % (ref 11.8–14.4)
GFR, ESTIMATED: >90 ML/MIN/1.73M2
GLUCOSE SERPL-MCNC: 94 MG/DL (ref 70–99)
HCT VFR BLD AUTO: 38.3 % (ref 40.7–50.3)
HGB BLD-MCNC: 13.1 G/DL (ref 13–17)
IMM GRANULOCYTES # BLD AUTO: 0.03 K/UL (ref 0–0.3)
IMM GRANULOCYTES NFR BLD: 0 %
LYMPHOCYTES NFR BLD: 1.83 K/UL (ref 1.1–3.7)
LYMPHOCYTES RELATIVE PERCENT: 22 % (ref 24–43)
MCH RBC QN AUTO: 32.5 PG (ref 25.2–33.5)
MCHC RBC AUTO-ENTMCNC: 34.2 G/DL (ref 28.4–34.8)
MCV RBC AUTO: 95 FL (ref 82.6–102.9)
MONOCYTES NFR BLD: 1.35 K/UL (ref 0.1–1.2)
MONOCYTES NFR BLD: 16 % (ref 3–12)
NEUTROPHILS NFR BLD: 59 % (ref 36–65)
NEUTS SEG NFR BLD: 4.94 K/UL (ref 1.5–8.1)
NRBC BLD-RTO: 0 PER 100 WBC
PLATELET # BLD AUTO: 354 K/UL (ref 138–453)
PMV BLD AUTO: 8.8 FL (ref 8.1–13.5)
POTASSIUM SERPL-SCNC: 4.3 MMOL/L (ref 3.7–5.3)
RBC # BLD AUTO: 4.03 M/UL (ref 4.21–5.77)
SODIUM SERPL-SCNC: 136 MMOL/L (ref 135–144)
WBC OTHER # BLD: 8.3 K/UL (ref 3.5–11.3)

## 2024-09-30 PROCEDURE — 85025 COMPLETE CBC W/AUTO DIFF WBC: CPT

## 2024-09-30 PROCEDURE — 99233 SBSQ HOSP IP/OBS HIGH 50: CPT | Performed by: INTERNAL MEDICINE

## 2024-09-30 PROCEDURE — 99232 SBSQ HOSP IP/OBS MODERATE 35: CPT | Performed by: INTERNAL MEDICINE

## 2024-09-30 PROCEDURE — 6360000002 HC RX W HCPCS: Performed by: INTERNAL MEDICINE

## 2024-09-30 PROCEDURE — 87252 VIRUS INOCULATION TISSUE: CPT

## 2024-09-30 PROCEDURE — 89051 BODY FLUID CELL COUNT: CPT

## 2024-09-30 PROCEDURE — 2580000003 HC RX 258: Performed by: INTERNAL MEDICINE

## 2024-09-30 PROCEDURE — 7100000000 HC PACU RECOVERY - FIRST 15 MIN: Performed by: INTERNAL MEDICINE

## 2024-09-30 PROCEDURE — 87102 FUNGUS ISOLATION CULTURE: CPT

## 2024-09-30 PROCEDURE — 6370000000 HC RX 637 (ALT 250 FOR IP): Performed by: INTERNAL MEDICINE

## 2024-09-30 PROCEDURE — 87205 SMEAR GRAM STAIN: CPT

## 2024-09-30 PROCEDURE — 3700000001 HC ADD 15 MINUTES (ANESTHESIA): Performed by: INTERNAL MEDICINE

## 2024-09-30 PROCEDURE — 80048 BASIC METABOLIC PNL TOTAL CA: CPT

## 2024-09-30 PROCEDURE — 87206 SMEAR FLUORESCENT/ACID STAI: CPT

## 2024-09-30 PROCEDURE — 2580000003 HC RX 258: Performed by: NURSE ANESTHETIST, CERTIFIED REGISTERED

## 2024-09-30 PROCEDURE — 87015 SPECIMEN INFECT AGNT CONCNTJ: CPT

## 2024-09-30 PROCEDURE — 6360000002 HC RX W HCPCS: Performed by: NURSE ANESTHETIST, CERTIFIED REGISTERED

## 2024-09-30 PROCEDURE — 7100000001 HC PACU RECOVERY - ADDTL 15 MIN: Performed by: INTERNAL MEDICINE

## 2024-09-30 PROCEDURE — 6360000002 HC RX W HCPCS: Performed by: NURSE PRACTITIONER

## 2024-09-30 PROCEDURE — 88305 TISSUE EXAM BY PATHOLOGIST: CPT

## 2024-09-30 PROCEDURE — 2709999900 HC NON-CHARGEABLE SUPPLY: Performed by: INTERNAL MEDICINE

## 2024-09-30 PROCEDURE — 1200000000 HC SEMI PRIVATE

## 2024-09-30 PROCEDURE — 36415 COLL VENOUS BLD VENIPUNCTURE: CPT

## 2024-09-30 PROCEDURE — 87300 AG DETECTION POLYVAL IF: CPT

## 2024-09-30 PROCEDURE — 88112 CYTOPATH CELL ENHANCE TECH: CPT

## 2024-09-30 PROCEDURE — 2580000003 HC RX 258: Performed by: NURSE PRACTITIONER

## 2024-09-30 PROCEDURE — 0B9C8ZX DRAINAGE OF RIGHT UPPER LUNG LOBE, VIA NATURAL OR ARTIFICIAL OPENING ENDOSCOPIC, DIAGNOSTIC: ICD-10-PCS | Performed by: INTERNAL MEDICINE

## 2024-09-30 PROCEDURE — 6370000000 HC RX 637 (ALT 250 FOR IP): Performed by: NURSE PRACTITIONER

## 2024-09-30 PROCEDURE — 87255 GENET VIRUS ISOLATE HSV: CPT

## 2024-09-30 PROCEDURE — 3609027000 HC BRONCHOSCOPY: Performed by: INTERNAL MEDICINE

## 2024-09-30 PROCEDURE — 87140 CULTURE TYPE IMMUNOFLUORESC: CPT

## 2024-09-30 PROCEDURE — 87070 CULTURE OTHR SPECIMN AEROBIC: CPT

## 2024-09-30 PROCEDURE — 2500000003 HC RX 250 WO HCPCS: Performed by: NURSE ANESTHETIST, CERTIFIED REGISTERED

## 2024-09-30 PROCEDURE — 3700000000 HC ANESTHESIA ATTENDED CARE: Performed by: INTERNAL MEDICINE

## 2024-09-30 PROCEDURE — 87106 FUNGI IDENTIFICATION YEAST: CPT

## 2024-09-30 RX ORDER — SODIUM CHLORIDE, SODIUM LACTATE, POTASSIUM CHLORIDE, CALCIUM CHLORIDE 600; 310; 30; 20 MG/100ML; MG/100ML; MG/100ML; MG/100ML
INJECTION, SOLUTION INTRAVENOUS
Status: DISCONTINUED | OUTPATIENT
Start: 2024-09-30 | End: 2024-09-30 | Stop reason: SDUPTHER

## 2024-09-30 RX ORDER — DIPHENHYDRAMINE HYDROCHLORIDE 50 MG/ML
12.5 INJECTION INTRAMUSCULAR; INTRAVENOUS
Status: DISCONTINUED | OUTPATIENT
Start: 2024-09-30 | End: 2024-09-30 | Stop reason: HOSPADM

## 2024-09-30 RX ORDER — SUCCINYLCHOLINE CHLORIDE 20 MG/ML
INJECTION INTRAMUSCULAR; INTRAVENOUS
Status: DISCONTINUED | OUTPATIENT
Start: 2024-09-30 | End: 2024-09-30 | Stop reason: SDUPTHER

## 2024-09-30 RX ORDER — LIDOCAINE HYDROCHLORIDE 20 MG/ML
INJECTION, SOLUTION EPIDURAL; INFILTRATION; INTRACAUDAL; PERINEURAL
Status: DISCONTINUED | OUTPATIENT
Start: 2024-09-30 | End: 2024-09-30 | Stop reason: SDUPTHER

## 2024-09-30 RX ORDER — FENTANYL CITRATE 50 UG/ML
INJECTION, SOLUTION INTRAMUSCULAR; INTRAVENOUS
Status: DISCONTINUED | OUTPATIENT
Start: 2024-09-30 | End: 2024-09-30 | Stop reason: SDUPTHER

## 2024-09-30 RX ORDER — ONDANSETRON 2 MG/ML
4 INJECTION INTRAMUSCULAR; INTRAVENOUS
Status: DISCONTINUED | OUTPATIENT
Start: 2024-09-30 | End: 2024-09-30 | Stop reason: HOSPADM

## 2024-09-30 RX ORDER — ONDANSETRON 2 MG/ML
INJECTION INTRAMUSCULAR; INTRAVENOUS
Status: DISCONTINUED | OUTPATIENT
Start: 2024-09-30 | End: 2024-09-30 | Stop reason: SDUPTHER

## 2024-09-30 RX ORDER — DEXAMETHASONE SODIUM PHOSPHATE 10 MG/ML
INJECTION, SOLUTION INTRAMUSCULAR; INTRAVENOUS
Status: DISCONTINUED | OUTPATIENT
Start: 2024-09-30 | End: 2024-09-30 | Stop reason: SDUPTHER

## 2024-09-30 RX ORDER — FENTANYL CITRATE 50 UG/ML
25 INJECTION, SOLUTION INTRAMUSCULAR; INTRAVENOUS EVERY 5 MIN PRN
Status: DISCONTINUED | OUTPATIENT
Start: 2024-09-30 | End: 2024-09-30 | Stop reason: HOSPADM

## 2024-09-30 RX ORDER — PROCHLORPERAZINE EDISYLATE 5 MG/ML
5 INJECTION INTRAMUSCULAR; INTRAVENOUS
Status: DISCONTINUED | OUTPATIENT
Start: 2024-09-30 | End: 2024-09-30 | Stop reason: HOSPADM

## 2024-09-30 RX ORDER — PROPOFOL 10 MG/ML
INJECTION, EMULSION INTRAVENOUS
Status: DISCONTINUED | OUTPATIENT
Start: 2024-09-30 | End: 2024-09-30 | Stop reason: SDUPTHER

## 2024-09-30 RX ADMIN — PIPERACILLIN AND TAZOBACTAM 3375 MG: 3; .375 INJECTION, POWDER, LYOPHILIZED, FOR SOLUTION INTRAVENOUS at 17:19

## 2024-09-30 RX ADMIN — ONDANSETRON 4 MG: 2 INJECTION INTRAMUSCULAR; INTRAVENOUS at 12:12

## 2024-09-30 RX ADMIN — OXYCODONE HYDROCHLORIDE 10 MG: 5 TABLET ORAL at 08:27

## 2024-09-30 RX ADMIN — SODIUM CHLORIDE, POTASSIUM CHLORIDE, SODIUM LACTATE AND CALCIUM CHLORIDE: 600; 310; 30; 20 INJECTION, SOLUTION INTRAVENOUS at 12:08

## 2024-09-30 RX ADMIN — DEXAMETHASONE SODIUM PHOSPHATE 10 MG: 10 INJECTION INTRAMUSCULAR; INTRAVENOUS at 12:12

## 2024-09-30 RX ADMIN — LINEZOLID 600 MG: 600 TABLET, FILM COATED ORAL at 19:10

## 2024-09-30 RX ADMIN — PIPERACILLIN AND TAZOBACTAM 3375 MG: 3; .375 INJECTION, POWDER, LYOPHILIZED, FOR SOLUTION INTRAVENOUS at 08:29

## 2024-09-30 RX ADMIN — PROPOFOL 150 MG: 10 INJECTION, EMULSION INTRAVENOUS at 12:12

## 2024-09-30 RX ADMIN — SODIUM CHLORIDE 10 ML/HR: 9 INJECTION, SOLUTION INTRAVENOUS at 17:18

## 2024-09-30 RX ADMIN — LIDOCAINE HYDROCHLORIDE 100 MG: 20 INJECTION, SOLUTION EPIDURAL; INFILTRATION; INTRACAUDAL; PERINEURAL at 12:12

## 2024-09-30 RX ADMIN — SUCCINYLCHOLINE CHLORIDE 100 MG: 20 INJECTION, SOLUTION INTRAMUSCULAR; INTRAVENOUS at 12:12

## 2024-09-30 RX ADMIN — FENTANYL CITRATE 75 MCG: 50 INJECTION INTRAMUSCULAR; INTRAVENOUS at 12:12

## 2024-09-30 RX ADMIN — FENTANYL CITRATE 25 MCG: 50 INJECTION INTRAMUSCULAR; INTRAVENOUS at 12:19

## 2024-09-30 RX ADMIN — OXYCODONE HYDROCHLORIDE 10 MG: 5 TABLET ORAL at 23:23

## 2024-09-30 RX ADMIN — LINEZOLID 600 MG: 600 TABLET, FILM COATED ORAL at 08:27

## 2024-09-30 RX ADMIN — PIPERACILLIN AND TAZOBACTAM 3375 MG: 3; .375 INJECTION, POWDER, LYOPHILIZED, FOR SOLUTION INTRAVENOUS at 23:25

## 2024-09-30 RX ADMIN — SODIUM CHLORIDE: 9 INJECTION, SOLUTION INTRAVENOUS at 16:11

## 2024-09-30 ASSESSMENT — ENCOUNTER SYMPTOMS
COUGH: 1
GASTROINTESTINAL NEGATIVE: 1
SHORTNESS OF BREATH: 1
SHORTNESS OF BREATH: 0

## 2024-09-30 ASSESSMENT — PAIN DESCRIPTION - DESCRIPTORS
DESCRIPTORS: ACHING
DESCRIPTORS: ACHING;DISCOMFORT

## 2024-09-30 ASSESSMENT — LIFESTYLE VARIABLES: SMOKING_STATUS: 1

## 2024-09-30 ASSESSMENT — PAIN SCALES - GENERAL
PAINLEVEL_OUTOF10: 7
PAINLEVEL_OUTOF10: 4
PAINLEVEL_OUTOF10: 7

## 2024-09-30 ASSESSMENT — PAIN - FUNCTIONAL ASSESSMENT
PAIN_FUNCTIONAL_ASSESSMENT: NONE - DENIES PAIN
PAIN_FUNCTIONAL_ASSESSMENT: ACTIVITIES ARE NOT PREVENTED

## 2024-09-30 ASSESSMENT — PAIN DESCRIPTION - ORIENTATION: ORIENTATION: RIGHT

## 2024-09-30 ASSESSMENT — PAIN DESCRIPTION - PAIN TYPE: TYPE: ACUTE PAIN

## 2024-09-30 ASSESSMENT — PAIN DESCRIPTION - FREQUENCY: FREQUENCY: INTERMITTENT

## 2024-09-30 ASSESSMENT — PAIN DESCRIPTION - LOCATION: LOCATION: RIB CAGE

## 2024-09-30 NOTE — PLAN OF CARE
Pt admitted for mass in RUL. TB test and fungal tests came back negative. Still c/o R ribcage pain treated with Marie. Patient having pain on their R ribcage and rates it a 7. Pain interventions includecorrect body alignment/body mechanics, relaxation techniques, medication, and regular rest periods. Patients goal for pain relief is  0 . The need for pain and symptom management will be considered in the discharge planning process to ensure patients comfort. Remains on RA with no issues. NS started at 0000 per orders. IV Zosyn and PO Zyvox for infection of R lung. Plan for bronchoscopy today, NPO since 0000. Vitals stable and safety maintained.     Problem: Pain  Goal: Verbalizes/displays adequate comfort level or baseline comfort level  9/30/2024 0414 by Delilah Barrientos, RN  Outcome: Progressing    Problem: Safety - Adult  Goal: Free from fall injury  9/30/2024 0414 by Delilah Barrientos, RN  Outcome: Progressing    Problem: Respiratory - Adult  Goal: Achieves optimal ventilation and oxygenation  Outcome: Progressing

## 2024-09-30 NOTE — OP NOTE
Operative Note      Patient: Kyle Guzman  YOB: 1970  MRN: [id number]    Date of Procedure: 9/30/2024    Pre-Op Diagnosis Codes:      * Respiratory abnormality [R06.9]    Post-Op Diagnosis: Same       Procedure(s):  BRONCHOSCOPY    Surgeon(s):  Drew Overton MD    Assistant:   * No surgical staff found *    Anesthesia: Monitor Anesthesia Care    Estimated Blood Loss (mL): None    Complications: None    Specimens:   ID Type Source Tests Collected by Time Destination   A : RIGHT UPPER LOBE BAL Body Fluid Lung CYTOLOGY, NON-GYN, CELL COUNT WITH DIFFERENTIAL, BODY FLUID, CULTURE, FUNGUS, FUNGAL STAIN, CULTURE, VIRUS, RESPIRATORY, CULTURE, RESPIRATORY Drew Overton MD 9/30/2024 1211        Implants:  * No implants in log *      Drains: * No LDAs found *        Detailed Description of Procedure:   Consent obtained from patient risk benefits complication explained to patient who verbalized understanding.  Patient verbalized understanding procedure.  Timeout was performed.      Patient was intubated by anesthesia.  He was placed on 100% oxygen.  Airway examination revealed thick white blobs of sputum otherwise clear airway and endobronchial lesion.  No lamination.  BAL done right upper lobe return was slightly turbid.  Patient tolerated procedure well.  BAL will be sent for bacterial fungal AFB culture cell count and cytology.      Electronically signed by Drew Overton MD on 9/30/2024 at 6:06 PM

## 2024-09-30 NOTE — PROGRESS NOTES
[facility]  Progress Note    Kyle Guzman  MRN: [id number]  Date: 9/30/2024  LOS: 4     Reason for F/U :   Right upper lung cavitary pneumonia    Impression :   Right lung apical consolidative nodular density with central cavitation and there is a halo of patchy groundglass attenuation up to 1.3 cm thickness  COPD/emphysema  Eczema on Dupixent    Recommendations:   The patient continues on intravenous antimicrobial therapy with piperacillin/tazobactam and oral linezolid   The serum QuantiFERON TB Gold testing came back negative  Aspergillus antigen and histoplasma antigen are pending  The patient is scheduled for a bronchoscopy today  The care was discussed with the patient and with the Dr. Kvng Tinsley  The tentative plan will be for discharge on antimicrobial therapy with outpatient follow-up and follow-up imaging    Infection Control Recommendations:   Universal precautions    Discharge Planning:   Estimated Length of IV antimicrobials: While hospitalized  Patient will need Midline Catheter Insertion/ PICC line Insertion: No  Patient will need: Home IV , Infusion Center,  SNF,  LTAC: Undetermined  Patient willneed outpatient wound care: No    Medical Decision making / Summary of Stay:   Kyle Guzman is a 53 y.o.-year-old male who was initially admitted on 9/26/2024.   Kyle is seen and evaluated at bedside and has COPD, eczema for which she has been on Dupixent for over a year over a year.     The patient reports that starting about a week and a half ago he started having some right-sided pleurisy without any significant cough and he has been working on clearing out a barn for close to 2 months now and he thought he had pulled a muscle.     The patient went to an urgent care center last week and had a chest x-ray done that did not show any significant findings and the thought was that the patient had a musculoskeletal problem.  The patient then reports he started having fevers, chills,  lymphocyte reactivity, stimulated by  TB2 antigens. An overall Negative result does not completely rule  out TB infection.  A false-positive result in the absence of other clinical evidence  of TB infection is not uncommon. Refer to: Updated Guidelines for  Using Interferon Gamma Release Assays to Detect Mycobacterium  tuberculosis Infection -- [place], 2010  (http://www.cdc.gov/mmwr/preview/mmwrhtml/nk2262u4.htm), for more  information concerning test performance in low-prevalence  populations and use in occupational screening.  Performed By: [facility]  [address] [place] [id number]  : Rhett Donald MD, PhD  CLIA Number: [id number]       QuantiFERON Mitogen 6.48 IU/mL     Quantiferon TB1 Minus NIL 0.00 IU/mL     Quantiferon TB2 Minus NIL 0.00 IU/mL     QuantiFERON Nil 0.00 IU/mL    Culture, Blood 1 [[id number]] Collected: 09/27/24 1240   Order Status: Completed Specimen: Blood Updated: 09/29/24 1705    Specimen Description .BLOOD    Special Requests         Culture NO GROWTH 2 DAYS   Culture, Blood 1 [[id number]] Collected: 09/27/24 1240   Order Status: Completed Specimen: Blood Updated: 09/29/24 1703    Specimen Description .BLOOD    Special Requests         Culture NO GROWTH 2 DAYS   Quantiferon TB Gold [[id number]] Collected: 09/27/24 1830   Order Status: Canceled Specimen: Blood    QUANTIFERON (R) TB GOLD, (INCUBATED) [[id number]] Collected: 09/27/24 0551   Order Status: Canceled Specimen: Blood    Respiratory Panel, Molecular, with COVID-19 (Restricted: peds pts or suitable admitted adults) [[id number]] Collected: 09/26/24 2208   Order Status: Completed Specimen: Nasopharyngeal Swab Updated: 09/26/24 2352    Specimen Description .NASOPHARYNGEAL SWAB    Adenovirus PCR Not Detected    Coronavirus 229E PCR Not Detected    Coronavirus HKU1 PCR Not Detected    Coronavirus NL63 PCR Not Detected    Coronavirus OC43 PCR Not Detected    SARS-CoV-2, PCR Not Detected

## 2024-09-30 NOTE — PROGRESS NOTES
Pulmonary Critical Care Progress Note    Patient seen for the follow up of Cavitating mass in right upper lung lobe     Subjective:    He is out of isolation.  He is n.p.o. for bronchoscopy.  He denies chest pain.  Has occasional dry cough.  No new complaints  Examination:    Vitals: /80   Pulse 68   Temp 98.8 °F (37.1 °C) (Oral)   Resp 16   Wt 61.9 kg (136 lb 6.4 oz)   SpO2 100%   BMI 18.50 kg/m²   SpO2  Av.3 %  Min: 94 %  Max: 100 %  General appearance: alert and cooperative with exam  Neck: No JVD  Lungs: Decreased breath sound no rales or wheezing  Heart: regular rate and rhythm, S1, S2 normal, no gallop  Abdomen: Soft, non tender, + BS  Extremities: no cyanosis or clubbing. No significant edema    LABs:    CBC:   Recent Labs     24  0455 24  0522   WBC 11.1 8.3   HGB 13.2 13.1   HCT 38.7* 38.3*    354     BMP:   Recent Labs     24  0455 24  0522    136   K 4.2 4.3   CO2 26 26   BUN 10 8   CREATININE 0.9 0.9   LABGLOM >90 >90   GLUCOSE 86 94       Radiology:  X-ray  ribs and chest  No opacity    CT chest                 Impression/recommendations:    Cavitating right upper lobe pneumonia new since recent x-ray doubt tuberculosis/atypical chest pain  Zosyn IV  Sputum culture  Check QuantiFERON test  ID on consult  Previously discussed with IR ; will plan for bronchoscopy if required per ID given recent onset process based on image/no need for IR guided biopsy  N.p.o. for bronchoscopy now  Follow-up CT chest outpatient     COPD/emphysema/smoker  DuoNeb as needed  Symbicort  Smoking cessation advised  PFT outpatient     DVT prophylaxis      Drew Overton MD, MD, [facility]  Pulmonary Critical Care and Sleep Medicine,  [facility]  Cell: [phone]  Office: [phone]

## 2024-09-30 NOTE — CARE COORDINATION
Discharge planning    Chart reviewed. Patient lives with wife in 1 story home. Independent. Drives. No DME     Admitted for cavitating mass to RUL. On IV zosyn and zyvox pr ID. Pulmonary following. QauntiFERON testing ordered. Pulmonary planning Bronch today.     Referral has been sent to [facility] if needs IV atb on discharge. Patient teachable if needed.     On RA

## 2024-09-30 NOTE — ANESTHESIA PRE PROCEDURE
Department of Anesthesiology  Preprocedure Note       Name:  Kyle Guzman   Age:  53 y.o.  :  1970                                          MRN:  [id number]         Date:  2024      Surgeon: Surgeon(s):  Drew Overton MD    Procedure: Procedure(s):  BRONCHOSCOPY    Medications prior to admission:   Prior to Admission medications    Medication Sig Start Date End Date Taking? Authorizing Provider   dupilumab (DUPIXENT) 300 MG/2ML SOPN injection Inject 300mg SQ at every 2 weeks 24  Yes Didi Luque MD   ibuprofen (ADVIL;MOTRIN) 800 MG tablet Take 1 tablet by mouth 3 times daily (with meals) 24   Sally Donis DO       Current medications:    Current Facility-Administered Medications   Medication Dose Route Frequency Provider Last Rate Last Admin   • lidocaine 4 % external patch 1 patch  1 patch TransDERmal Daily Eda Barreto APRN - CNP   1 patch at 24 0837   • 0.9 % sodium chloride infusion   IntraVENous Continuous OrloKvng pepper DO 50 mL/hr at 24 0707 Rate Verify at 24 0707   • ibuprofen (ADVIL;MOTRIN) tablet 800 mg  800 mg Oral Q8H PRN Kvng Tinsley, DO       • acetaminophen (TYLENOL) tablet 650 mg  650 mg Oral Q6H PRN OrlopKvng, DO        Or   • acetaminophen (TYLENOL) suppository 650 mg  650 mg Rectal Q6H PRN OrlopKvng, DO       • oxyCODONE (ROXICODONE) immediate release tablet 5 mg  5 mg Oral Q4H PRN Kvng Tinsley, DO   5 mg at 24 1417    Or   • oxyCODONE (ROXICODONE) immediate release tablet 10 mg  10 mg Oral Q4H PRN Kvng Tinsley DO   10 mg at 24 0827   • piperacillin-tazobactam (ZOSYN) 3,375 mg in sodium chloride 0.9 % 50 mL IVPB (mini-bag)  3,375 mg IntraVENous Q8H Reyna Farias APRN - NP 12.5 mL/hr at 24 0829 3,375 mg at 24 0829   • budesonide-formoterol (SYMBICORT) 160-4.5 MCG/ACT inhaler 2 puff  2 puff Inhalation BID RT Drew Overton MD       • linezolid (ZYVOX) tablet 600 mg  600 mg Oral 2 times

## 2024-09-30 NOTE — PLAN OF CARE
Pt admit with mass in RUL. Pt came back negative for TB, and bronch done today with washings and biopsies taken. Pt still on IV atb, and roxicodone for pain.    Problem: Pain  Goal: Verbalizes/displays adequate comfort level or baseline comfort level  9/30/2024 1758 by Lauryn Ignacio, RN  Outcome: Progressing  Patient having pain on their back and rates it a 7. Pain interventions includerelaxation techniques and medication. Patients goal for pain relief is 2. The need for pain and symptom management will be considered in the discharge planning process to ensure patients comfort.        Problem: Infection - Adult  Goal: Absence of infection at discharge  9/30/2024 1758 by Lauryn Ignacio, RN  Outcome: Progressing

## 2024-09-30 NOTE — PROGRESS NOTES
PM       Radiology:  CT CHEST WO CONTRAST    Result Date: 9/26/2024  CHEST: 1. Interval development of a 3.3 cm right lung apex solid consolidative appearing nodular density with central cavitation or bleb there is a Halo of patchy ground-glass attenuation up to 1.3 cm thickness. Findings not present on chest x-ray of 09/20/2024 and would support infection rather than malignancy.  Follow-up after therapy advised to confirm resolution and exclude less likely differential of malignancy.. 2. Emphysematous changes mainly in the upper lobes. 3. No acute intra-abdominal or pelvic process.     CT ABDOMEN W CONTRAST Additional Contrast? None    Result Date: 9/26/2024  CHEST: 1. Interval development of a 3.3 cm right lung apex solid consolidative appearing nodular density with central cavitation or bleb there is a Halo of patchy ground-glass attenuation up to 1.3 cm thickness. Findings not present on chest x-ray of 09/20/2024 and would support infection rather than malignancy.  Follow-up after therapy advised to confirm resolution and exclude less likely differential of malignancy.. 2. Emphysematous changes mainly in the upper lobes. 3. No acute intra-abdominal or pelvic process.       Physical Examination:        General appearance:  alert, cooperative and no distress  Mental Status:  oriented to person, place and time and normal affect  Lungs:  clear to auscultation bilaterally, normal effort, diminished throughout  Heart:  regular rate and rhythm, no murmur  Abdomen:  soft, nontender, nondistended, normal bowel sounds, no masses, hepatomegaly, splenomegaly  Extremities:  no edema, redness, tenderness in the calves  Skin:  no gross lesions, rashes, induration    Assessment:        Hospital Problems             Last Modified POA    * (Principal) Cavitating mass in right upper lung lobe 9/26/2024 Yes    Lung infection 9/26/2024 Yes    COPD without exacerbation (HCC) 9/27/2024 Yes    Arthritis 9/26/2024 Yes

## 2024-10-01 LAB
APPEARANCE FLD: NORMAL
ASPERGILLUS AB SER QL ID: NOT DETECTED
BODY FLD TYPE: NORMAL
CASE NUMBER:: NORMAL
CLOT CHECK: NORMAL
COLOR FLD: COLORLESS
MICROORGANISM SPEC CULT: NORMAL
MICROORGANISM/AGENT SPEC: NORMAL
NUC CELL # FLD: 174 CELLS/UL
RBC # FLD: 4 CELLS/UL
SERVICE CMNT-IMP: NORMAL
SPECIMEN DESCRIPTION: NORMAL
SPECIMEN DESCRIPTION: NORMAL
UNIDENT CELLS NFR FLD: NORMAL %

## 2024-10-01 PROCEDURE — 6370000000 HC RX 637 (ALT 250 FOR IP): Performed by: INTERNAL MEDICINE

## 2024-10-01 PROCEDURE — 1200000000 HC SEMI PRIVATE

## 2024-10-01 PROCEDURE — 99232 SBSQ HOSP IP/OBS MODERATE 35: CPT | Performed by: STUDENT IN AN ORGANIZED HEALTH CARE EDUCATION/TRAINING PROGRAM

## 2024-10-01 PROCEDURE — 2580000003 HC RX 258: Performed by: INTERNAL MEDICINE

## 2024-10-01 PROCEDURE — 6360000002 HC RX W HCPCS: Performed by: INTERNAL MEDICINE

## 2024-10-01 PROCEDURE — 99232 SBSQ HOSP IP/OBS MODERATE 35: CPT | Performed by: INTERNAL MEDICINE

## 2024-10-01 RX ORDER — MOXIFLOXACIN HYDROCHLORIDE 400 MG/1
400 TABLET ORAL DAILY
Qty: 21 TABLET | Refills: 0 | Status: SHIPPED | OUTPATIENT
Start: 2024-10-01 | End: 2024-10-22

## 2024-10-01 RX ORDER — MOXIFLOXACIN HYDROCHLORIDE 400 MG/1
400 TABLET ORAL DAILY
Qty: 21 TABLET | Refills: 0 | Status: SHIPPED | OUTPATIENT
Start: 2024-10-01 | End: 2024-10-01

## 2024-10-01 RX ADMIN — PIPERACILLIN AND TAZOBACTAM 3375 MG: 3; .375 INJECTION, POWDER, LYOPHILIZED, FOR SOLUTION INTRAVENOUS at 08:22

## 2024-10-01 RX ADMIN — LINEZOLID 600 MG: 600 TABLET, FILM COATED ORAL at 08:21

## 2024-10-01 RX ADMIN — LINEZOLID 600 MG: 600 TABLET, FILM COATED ORAL at 20:52

## 2024-10-01 RX ADMIN — SODIUM CHLORIDE: 9 INJECTION, SOLUTION INTRAVENOUS at 12:36

## 2024-10-01 RX ADMIN — PIPERACILLIN AND TAZOBACTAM 3375 MG: 3; .375 INJECTION, POWDER, LYOPHILIZED, FOR SOLUTION INTRAVENOUS at 22:56

## 2024-10-01 RX ADMIN — OXYCODONE HYDROCHLORIDE 5 MG: 5 TABLET ORAL at 09:21

## 2024-10-01 RX ADMIN — OXYCODONE HYDROCHLORIDE 10 MG: 5 TABLET ORAL at 22:53

## 2024-10-01 RX ADMIN — PIPERACILLIN AND TAZOBACTAM 3375 MG: 3; .375 INJECTION, POWDER, LYOPHILIZED, FOR SOLUTION INTRAVENOUS at 15:26

## 2024-10-01 RX ADMIN — OXYCODONE HYDROCHLORIDE 5 MG: 5 TABLET ORAL at 15:30

## 2024-10-01 ASSESSMENT — PAIN DESCRIPTION - PAIN TYPE: TYPE: ACUTE PAIN

## 2024-10-01 ASSESSMENT — PAIN DESCRIPTION - ONSET
ONSET: ON-GOING
ONSET: ON-GOING

## 2024-10-01 ASSESSMENT — ENCOUNTER SYMPTOMS
COUGH: 1
GASTROINTESTINAL NEGATIVE: 1

## 2024-10-01 ASSESSMENT — PAIN DESCRIPTION - LOCATION
LOCATION: CHEST
LOCATION: FLANK
LOCATION: CHEST

## 2024-10-01 ASSESSMENT — PAIN DESCRIPTION - FREQUENCY
FREQUENCY: INTERMITTENT
FREQUENCY: CONTINUOUS

## 2024-10-01 ASSESSMENT — PAIN SCALES - GENERAL
PAINLEVEL_OUTOF10: 0
PAINLEVEL_OUTOF10: 7
PAINLEVEL_OUTOF10: 4
PAINLEVEL_OUTOF10: 0
PAINLEVEL_OUTOF10: 5
PAINLEVEL_OUTOF10: 2
PAINLEVEL_OUTOF10: 0
PAINLEVEL_OUTOF10: 6

## 2024-10-01 ASSESSMENT — PAIN DESCRIPTION - ORIENTATION
ORIENTATION: RIGHT

## 2024-10-01 ASSESSMENT — PAIN - FUNCTIONAL ASSESSMENT: PAIN_FUNCTIONAL_ASSESSMENT: PREVENTS OR INTERFERES SOME ACTIVE ACTIVITIES AND ADLS

## 2024-10-01 ASSESSMENT — PAIN DESCRIPTION - DESCRIPTORS
DESCRIPTORS: ACHING
DESCRIPTORS: ACHING
DESCRIPTORS: ACHING;DULL

## 2024-10-01 NOTE — PLAN OF CARE
Problem: Discharge Planning  Goal: Discharge to home or other facility with appropriate resources  10/1/2024 0247 by Nathaly Miller RN  Outcome: Progressing     Problem: Pain  Goal: Verbalizes/displays adequate comfort level or baseline comfort level  10/1/2024 0247 by Nathaly Miller RN  Outcome: Progressing     Problem: Safety - Adult  Goal: Free from fall injury  10/1/2024 0247 by Nathaly Miller RN  Outcome: Progressing     Problem: Infection - Adult  Goal: Absence of infection at discharge  10/1/2024 0247 by Nathaly Miller RN  Outcome: Progressing     Problem: Respiratory - Adult  Goal: Achieves optimal ventilation and oxygenation  10/1/2024 0247 by Nathaly Miller RN  Outcome: Progressing

## 2024-10-01 NOTE — PROGRESS NOTES
[facility]  Office: [phone]  Riley Weaver, DO, Brendan Smith, DO, Kvng Tinsley DO, Bao Weiss, DO, Beronica Almaraz MD, Haylee Guajardo MD, Radha Lee MD, Hope Oliva MD,  Jose Mari MD, Milagro Turner MD, Diamond Shepherd MD,  Dafne Gaona DO, Meliton Sun MD, Brent Serrano MD, Rod Weaver DO, Tabitha Harris MD,  Hernesto Ly DO, Paula Cramer MD, Shana Diego MD, Terese Zhong MD, Marjorie Granda MD,  Reggie Morley MD, Ronald Flores MD, Frida Gomez MD, Saida Hall MD, Boyd Aquino MD, Glen Ace MD, Rigoberto Guo, DO, Khoi Fonseca DO, Derian Hunter DO, Ady Esparza MD, Shirley Waterhouse, CNP,  Marely Crowell CNP, Rigoberto Hanson, CNP,  Joyce Elise, DNP, Estefany Mckenzie, CNP, Chetna Arita, CNP, Nathaly Lange, CNP, Michela Lira, CNP, Courtney Carroll, PA-C, Rosalie Colón PA-C, Adele Gudino, CNP, Nicol Molina, CNP,  Malinda Du, CNP, Reyna Farias, CNP, Emily Diaz, CNP, Christa Burnett, CNS, Jessica Blanco, CNP, Barbara Klein, CNP, Tracy Schwab, CNP         [facility]   IN-PATIENT SERVICE   [facility]    Progress Note    10/1/2024    9:53 AM    Name:   Kyle Guzman  MRN:     [id number]     Acct:      [id number]   Room:   2102/2102-01   Day:  5  Admit Date:  9/26/2024  6:22 PM    PCP:   Sally Donis DO  Code Status:  Full Code    Subjective:     C/C: Chest wall pain, night sweats, cavitary lung lesion    Interval History Status: Improved.     Patient continues to improve, no further night sweats or other complaints.  Occasional dry cough, no sputum production.  Denies any chest pain, nausea vomiting, fevers or chills    Brief History:     This is a 53-year-old male that presents with a approximately 7 to 8-day history of right sided rib and chest wall pain.  Worsened with movement and deep inspiration.  He was seen in urgent care approximate 4 days ago with a chest x-ray completed without acute  pathology.  He was prescribed analgesics.  He subsequent follow-up with his PCP due to ongoing symptoms and had a CT scan completed with findings of cavitary lung lesion.  He does report intermittent night sweats and chills and not feeling well for approximately a week.  Over the last few weeks he has been cleaning out his mother-in-law's home and to [place].  There apparently is mold in all the buildings as well as scattered rodent feces massive amount of dust and dirt.    Underwent bronch 9/30 awaiting results as of 10/1    Review of Systems:     Constitutional:  negative for chills, fevers, sweats  Respiratory:  negative for cough, dyspnea on exertion, shortness of breath, wheezing  Cardiovascular:  negative for chest pain, chest pressure/discomfort, lower extremity edema, palpitations  Gastrointestinal:  negative for abdominal pain, constipation, diarrhea, nausea, vomiting  Neurological:  negative for dizziness, headache    Medications:     Allergies:    Allergies   Allergen Reactions    Chantix [Varenicline]      Bad temper       Current Meds:   Scheduled Meds:    lidocaine  1 patch TransDERmal Daily    piperacillin-tazobactam  3,375 mg IntraVENous Q8H    budesonide-formoterol  2 puff Inhalation BID RT    linezolid  600 mg Oral 2 times per day    sodium chloride flush  5-40 mL IntraVENous 2 times per day    enoxaparin  40 mg SubCUTAneous Daily     Continuous Infusions:    sodium chloride 50 mL/hr at 10/01/24 0608    sodium chloride Stopped (09/30/24 2150)     PRN Meds: ibuprofen, acetaminophen **OR** acetaminophen, oxyCODONE **OR** oxyCODONE, cyclobenzaprine, sodium chloride flush, sodium chloride, ondansetron **OR** ondansetron, magnesium hydroxide, albuterol, ipratropium 0.5 mg-albuterol 2.5 mg    Data:     Past Medical History:   has a past medical history of Arthritis, COPD (chronic obstructive pulmonary disease) ([facility]), and COVID-19.    Social History:   reports that he has been smoking cigarettes. He

## 2024-10-01 NOTE — PROGRESS NOTES
Culture, Blood 1 [[id number]] Collected: 09/27/24 1240   Order Status: Completed Specimen: Blood Updated: 09/30/24 1705    Specimen Description .BLOOD    Special Requests         Culture NO GROWTH 3 DAYS   Culture, Blood 1 [[id number]] Collected: 09/27/24 1240   Order Status: Completed Specimen: Blood Updated: 09/30/24 1703    Specimen Description .BLOOD    Special Requests         Culture NO GROWTH 3 DAYS   Culture, Virus, Respiratory [[id number]] Collected: 09/30/24 1211   Order Status: Sent Specimen: Body Fluid from Lung Updated: 09/30/24 1430   Fungal stain [[id number]] Collected: 09/30/24 1211   Order Status: Canceled Specimen: Body Fluid from Lung    Quantiferon TB Gold [[id number]] Collected: 09/27/24 0702   Order Status: Completed Updated: 09/29/24 1819    Quantiferon TB Minus NIL Negative    Comment: (NOTE)  INTERPRETIVE INFORMATION:Quantiferon TB Gold Plus  Interferon gamma release is measured for specimens from each of  the four collection tubes. A qualitative result (Negative,  Positive, or Indeterminate) is based on interpretation of the four  values: NIL, MITOGEN minus NIL (MITOGEN-NIL), TB1 minus NIL  (TB1-NIL), and TB2 minus NIL (TB2-NIL). The NIL value represents  nonspecific reactivity produced by the patient specimen. The  MITOGEN-NIL value serves as the positive control for the patient  specimen, demonstrating successful lymphocyte activity. The  TB1-NIL tube specifically detects CD4+ lymphocyte reactivity,  specifically stimulated by the TB1 antigens. The TB2-NIL tube  detects both CD4+ and CD8+ lymphocyte reactivity, stimulated by  TB2 antigens. An overall Negative result does not completely rule  out TB infection.  A false-positive result in the absence of other clinical evidence  of TB infection is not uncommon. Refer to: Updated Guidelines for  Using Interferon Gamma Release Assays to Detect Mycobacterium  tuberculosis Infection -- [place],  2010  (http://www.cdc.gov/mmwr/preview/mmwrhtml/ew1085f8.htm), for more  information concerning test performance in low-prevalence  populations and use in occupational screening.  Performed By: [facility]  [address] [place] [address]  : Rhett Donald MD, PhD  CLIA Number: [id number]       QuantiFERON Mitogen 6.48 IU/mL     Quantiferon TB1 Minus NIL 0.00 IU/mL     Quantiferon TB2 Minus NIL 0.00 IU/mL     QuantiFERON Nil 0.00 IU/mL    Quantiferon TB Gold [[id number]] Collected: 09/27/24 1830   Order Status: Canceled Specimen: Blood    QUANTIFERON (R) TB GOLD, (INCUBATED) [[id number]] Collected: 09/27/24 0551   Order Status: Canceled Specimen: Blood    Respiratory Panel, Molecular, with COVID-19 (Restricted: peds pts or suitable admitted adults) [[id number]] Collected: 09/26/24 2208   Order Status: Completed Specimen: Nasopharyngeal Swab Updated: 09/26/24 2352    Specimen Description .NASOPHARYNGEAL SWAB    Adenovirus PCR Not Detected    Coronavirus 229E PCR Not Detected    Coronavirus HKU1 PCR Not Detected    Coronavirus NL63 PCR Not Detected    Coronavirus OC43 PCR Not Detected    SARS-CoV-2, PCR Not Detected    Human Metapneumovirus PCR Not Detected    Rhino/Enterovirus PCR Not Detected    Influenza A by PCR Not Detected    Influenza B by PCR Not Detected    Parainfluenza 1 PCR Not Detected    Parainfluenza 2 PCR Not Detected    Parainfluenza 3 PCR Not Detected    Parainfluenza 4 PCR Not Detected    Resp Syncytial Virus PCR Not Detected    Bordetella parapertussis by PCR Not Detected    B Pertussis by PCR Not Detected    Chlamydia pneumoniae By PCR Not Detected    Mycoplasma pneumo by PCR Not Detected    Comment: Performed by multiplexed nucleic acid assay.      Culture, Respiratory [[id number]]    Order Status: No result Specimen: Sputum Expectorated    Culture with Smear, Acid Fast Bacillius [[id number]]    Order Status: No result Specimen: Sputum Aspirated

## 2024-10-01 NOTE — PROGRESS NOTES
Pulmonary Critical Care Progress Note    Patient seen for the follow up of Cavitating mass in right upper lung lobe     Subjective:    He is out of isolation.  He is tolerating diet   He denies chest pain.  Has occasional dry cough.  No new complaints  Examination:    Vitals: BP 96/79   Pulse 77   Temp 97.9 °F (36.6 °C) (Oral)   Resp 16   Wt 62.6 kg (137 lb 14.4 oz)   SpO2 99%   BMI 18.70 kg/m²   SpO2  Av.3 %  Min: 96 %  Max: 100 %  General appearance: alert and cooperative with exam  Neck: No JVD  Lungs: Decreased breath sound no rales or wheezing  Heart: regular rate and rhythm, S1, S2 normal, no gallop  Abdomen: Soft, non tender, + BS  Extremities: no cyanosis or clubbing. No significant edema    LABs:    CBC:   Recent Labs     24  0455 24  0522   WBC 11.1 8.3   HGB 13.2 13.1   HCT 38.7* 38.3*    354     BMP:   Recent Labs     24  0455 24  0522    136   K 4.2 4.3   CO2 26 26   BUN 10 8   CREATININE 0.9 0.9   LABGLOM >90 >90   GLUCOSE 86 94       Radiology:  X-ray  ribs and chest  No opacity    CT chest                 Impression/recommendations:    Cavitating right upper lobe pneumonia new since recent x-ray doubt tuberculosis/atypical chest pain  Zosyn IV  Sputum culture  QuantiFERON test came back negative   ID on consult  Previously discussed with IR ; will plan for bronchoscopy if required per ID given recent onset process based on image/no need for IR guided biopsy  Check bronchoscopy BAL cultures   Follow-up CT chest outpatient  Repeat CXR      COPD/emphysema/smoker  DuoNeb as needed  Symbicort  Smoking cessation advised  PFT outpatient     DVT prophylaxis      Drew Overton MD, MD, [facility]  Pulmonary Critical Care and Sleep Medicine,  [facility]  Cell: [phone]  Office: [phone]

## 2024-10-01 NOTE — PROGRESS NOTES
Spiritual Health History and Assessment/Progress Note  [facility]    (P) Initial Encounter,  ,  ,      Name: Kyle Guzman MRN: [id number]    Age: 53 y.o.     Sex: male   Language: English   Latter day: Non-Holiness   Cavitating mass in right upper lung lobe     Date: 10/1/2024            Total Time Calculated: (P) 10 min              Spiritual Assessment began in [facility]        Referral/Consult From: (P) Rounding   Encounter Overview/Reason: (P) Initial Encounter  Service Provided For: (P) Patient    Sue, Belief, Meaning:   Patient identifies as spiritual  Family/Friends No family/friends present      Importance and Influence:  Patient has no beliefs influential to healthcare decision-making identified during this visit  Family/Friends No family/friends present    Community:  Patient feels well-supported. Support system includes: Spouse/Partner and Other: family members  Family/Friends No family/friends present    Assessment and Plan of Care:     Patient Interventions include: Facilitated expression of thoughts and feelings and Affirmed coping skills/support systems  Family/Friends Interventions include: No family/friends present    Patient Plan of Care: Spiritual Care available upon further referral  Family/Friends Plan of Care: Spiritual Care available upon further referral    Electronically signed by Chaplain Randolph on 10/1/2024 at 3:58 PM

## 2024-10-02 ENCOUNTER — APPOINTMENT (OUTPATIENT)
Dept: GENERAL RADIOLOGY | Age: 54
DRG: 178 | End: 2024-10-02
Attending: INTERNAL MEDICINE
Payer: COMMERCIAL

## 2024-10-02 VITALS
WEIGHT: 141.1 LBS | RESPIRATION RATE: 18 BRPM | HEART RATE: 70 BPM | OXYGEN SATURATION: 98 % | BODY MASS INDEX: 19.11 KG/M2 | DIASTOLIC BLOOD PRESSURE: 65 MMHG | HEIGHT: 72 IN | TEMPERATURE: 98.1 F | SYSTOLIC BLOOD PRESSURE: 131 MMHG

## 2024-10-02 PROBLEM — R06.9 RESPIRATORY ABNORMALITY: Status: ACTIVE | Noted: 2024-10-02

## 2024-10-02 PROBLEM — J98.4 CAVITATING MASS IN RIGHT UPPER LUNG LOBE: Status: RESOLVED | Noted: 2024-09-26 | Resolved: 2024-10-02

## 2024-10-02 LAB
HISTOPLASMA AG, S: NOT DETECTED
HISTOPLASMA ANTIGEN, SERUM: NOT DETECTED
MICROORGANISM SPEC CULT: ABNORMAL
MICROORGANISM SPEC CULT: NORMAL
MICROORGANISM SPEC CULT: NORMAL
MICROORGANISM/AGENT SPEC: ABNORMAL
MICROORGANISM/AGENT SPEC: ABNORMAL
SERVICE CMNT-IMP: ABNORMAL
SERVICE CMNT-IMP: NORMAL
SERVICE CMNT-IMP: NORMAL
SPECIMEN DESCRIPTION: ABNORMAL
SPECIMEN DESCRIPTION: NORMAL
SPECIMEN DESCRIPTION: NORMAL
SURGICAL PATHOLOGY REPORT: NORMAL

## 2024-10-02 PROCEDURE — 6370000000 HC RX 637 (ALT 250 FOR IP): Performed by: INTERNAL MEDICINE

## 2024-10-02 PROCEDURE — 2580000003 HC RX 258: Performed by: INTERNAL MEDICINE

## 2024-10-02 PROCEDURE — 71046 X-RAY EXAM CHEST 2 VIEWS: CPT

## 2024-10-02 PROCEDURE — 99232 SBSQ HOSP IP/OBS MODERATE 35: CPT | Performed by: NURSE PRACTITIONER

## 2024-10-02 PROCEDURE — 6360000002 HC RX W HCPCS: Performed by: INTERNAL MEDICINE

## 2024-10-02 PROCEDURE — 99232 SBSQ HOSP IP/OBS MODERATE 35: CPT | Performed by: STUDENT IN AN ORGANIZED HEALTH CARE EDUCATION/TRAINING PROGRAM

## 2024-10-02 RX ORDER — BUDESONIDE AND FORMOTEROL FUMARATE DIHYDRATE 160; 4.5 UG/1; UG/1
2 AEROSOL RESPIRATORY (INHALATION)
Qty: 10.2 G | Refills: 3 | Status: SHIPPED | OUTPATIENT
Start: 2024-10-02

## 2024-10-02 RX ADMIN — PIPERACILLIN AND TAZOBACTAM 3375 MG: 3; .375 INJECTION, POWDER, LYOPHILIZED, FOR SOLUTION INTRAVENOUS at 15:34

## 2024-10-02 RX ADMIN — IBUPROFEN 800 MG: 800 TABLET ORAL at 07:35

## 2024-10-02 RX ADMIN — PIPERACILLIN AND TAZOBACTAM 3375 MG: 3; .375 INJECTION, POWDER, LYOPHILIZED, FOR SOLUTION INTRAVENOUS at 07:20

## 2024-10-02 RX ADMIN — LINEZOLID 600 MG: 600 TABLET, FILM COATED ORAL at 07:35

## 2024-10-02 RX ADMIN — IBUPROFEN 800 MG: 800 TABLET ORAL at 15:34

## 2024-10-02 RX ADMIN — SODIUM CHLORIDE: 9 INJECTION, SOLUTION INTRAVENOUS at 07:34

## 2024-10-02 ASSESSMENT — PAIN DESCRIPTION - ONSET
ONSET: ON-GOING
ONSET: ON-GOING

## 2024-10-02 ASSESSMENT — PAIN DESCRIPTION - ORIENTATION: ORIENTATION: RIGHT;MID;OTHER (COMMENT)

## 2024-10-02 ASSESSMENT — PAIN DESCRIPTION - LOCATION
LOCATION: CHEST;RIB CAGE
LOCATION: CHEST;RIB CAGE

## 2024-10-02 ASSESSMENT — PAIN DESCRIPTION - DESCRIPTORS
DESCRIPTORS: ACHING;DULL
DESCRIPTORS: ACHING;DULL

## 2024-10-02 ASSESSMENT — PAIN DESCRIPTION - PAIN TYPE
TYPE: ACUTE PAIN
TYPE: ACUTE PAIN

## 2024-10-02 ASSESSMENT — PAIN DESCRIPTION - FREQUENCY
FREQUENCY: CONTINUOUS
FREQUENCY: CONTINUOUS

## 2024-10-02 ASSESSMENT — PAIN SCALES - GENERAL
PAINLEVEL_OUTOF10: 3
PAINLEVEL_OUTOF10: 3
PAINLEVEL_OUTOF10: 2
PAINLEVEL_OUTOF10: 6
PAINLEVEL_OUTOF10: 5
PAINLEVEL_OUTOF10: 4

## 2024-10-02 ASSESSMENT — ENCOUNTER SYMPTOMS
RESPIRATORY NEGATIVE: 1
GASTROINTESTINAL NEGATIVE: 1

## 2024-10-02 NOTE — PLAN OF CARE
Problem: Discharge Planning  Goal: Discharge to home or other facility with appropriate resources  Outcome: Progressing     Problem: Pain  Goal: Verbalizes/displays adequate comfort level or baseline comfort level  Outcome: Progressing     Problem: Safety - Adult  Goal: Free from fall injury  Outcome: Progressing     Problem: Infection - Adult  Goal: Absence of infection at discharge  Outcome: Progressing  Goal: Absence of infection during hospitalization  Outcome: Progressing  Goal: Absence of fever/infection during anticipated neutropenic period  Outcome: Progressing     Problem: Respiratory - Adult  Goal: Achieves optimal ventilation and oxygenation  Outcome: Progressing

## 2024-10-02 NOTE — PROGRESS NOTES
[facility]  Office: [phone]  Riley Weaver, DO, Brendan Smith, DO, Kvng Tinsley DO, Bao Weiss, DO, Beronica Almaraz MD, Haylee Guajardo MD, Radha Lee MD, Hope Oliva MD,  Jose Mari MD, Milagro Turner MD, Diamond Shepherd MD,  Dafne Gaona DO, Meliton Sun MD, Brent Serrano MD, Rod Weaver DO, Tabitha Harris MD,  Hernesto Ly DO, Paula Cramer MD, Shana Diego MD, Terese Zhong MD, Marjorie Granda MD,  Reggie Morley MD, Ronald Flores MD, Frida Gomez MD, Saida Hall MD, Boyd Aquino MD, Glen Ace MD, Rigoberto Guo, DO, Khoi Fonseca DO, Derian Hunter DO, Ady Esparza MD, Shirley Waterhouse, CNP,  Marely Crowell CNP, Rigoberto Hanson, CNP,  Joyce Elise, DNP, Estefany Mckenzie, CNP, Chetna Arita, CNP, Nathaly Lange, CNP, Michela Lira, CNP, Courtney Carroll, PA-C, Rosalie Colón PA-C, Adele Gudino, CNP, Nicol Molina, CNP,  Malinda Du, CNP, Reyna Farias, CNP, Emily Diaz, CNP, Christa Burnett, CNS, Jessica Blanco, CNP, Barbara Klein, CNP, Tracy Schwab, CNP         [facility]   IN-PATIENT SERVICE   [facility]    Progress Note    10/2/2024    7:22 PM    Name:   Kyle Guzman  MRN:     [id number]     Acct:      [id number]   Room:   2102/2102-01   Day:  6  Admit Date:  9/26/2024  6:22 PM    PCP:   Sally Donis DO  Code Status:  Full Code    Subjective:     C/C: Chest wall pain, night sweats, cavitary lung lesion    Interval History Status: Improved.     Patient seen and examined this morning.  No acute issues overnight.  Resting comfortably and enjoying his meal.  Discussed the lab work finding with him.  Earlier during the day fungal cultures were not back.  Later on they resulted and histoplasma antigen is negative.  Pathology results are negative for malignancy.  Respiratory cultures growing gram-negative rods.  Blood cultures negative so far.    ID and pulmonology okay with discharge.    Brief History:  no...

## 2024-10-02 NOTE — PLAN OF CARE
Problem: Discharge Planning  Goal: Discharge to home or other facility with appropriate resources  Outcome: Adequate for Discharge  Flowsheets (Taken 10/2/2024 0740)  Discharge to home or other facility with appropriate resources:   Identify barriers to discharge with patient and caregiver   Arrange for needed discharge resources and transportation as appropriate   Identify discharge learning needs (meds, wound care, etc)   Refer to discharge planning if patient needs post-hospital services based on physician order or complex needs related to functional status, cognitive ability or social support system     Problem: Pain  Goal: Verbalizes/displays adequate comfort level or baseline comfort level  Outcome: Adequate for Discharge  Flowsheets (Taken 10/2/2024 0735)  Verbalizes/displays adequate comfort level or baseline comfort level:   Encourage patient to monitor pain and request assistance   Assess pain using appropriate pain scale   Implement non-pharmacological measures as appropriate and evaluate response   Administer analgesics based on type and severity of pain and evaluate response   Notify Licensed Independent Practitioner if interventions unsuccessful or patient reports new pain     Problem: Safety - Adult  Goal: Free from fall injury  Outcome: Adequate for Discharge     Problem: Infection - Adult  Goal: Absence of infection at discharge  Outcome: Adequate for Discharge  Flowsheets (Taken 10/2/2024 0740)  Absence of infection at discharge:   Assess and monitor for signs and symptoms of infection   Monitor lab/diagnostic results   Monitor all insertion sites i.e., indwelling lines, tubes and drains   Administer medications as ordered   Instruct and encourage patient and family to use good hand hygiene technique  Goal: Absence of infection during hospitalization  Outcome: Adequate for Discharge  Flowsheets (Taken 10/2/2024 0740)  Absence of infection during hospitalization:   Assess and monitor for signs and

## 2024-10-02 NOTE — DISCHARGE SUMMARY
[facility]  Office: [phone]  Riley Weaver DO, Brendan Smith DO, Kvng Tinsley DO, Bao Weiss DO, Beronica Almaraz MD, Haylee Guajardo MD, Radha Lee MD, Hope Oliva MD,  Jose Mari MD, Milagro Turner MD, Derian Hunter DO, Diamond Shepherd MD,  Dafne Gaona DO, Meliton Sun MD, Brent Serrano MD, Rod Weaver DO, Tabitha Harris MD,  Hernesto Ly DO, Paula Cramer MD, Shana Diego MD, Terese Zhong MD, Marjorie Granda MD,  Reggie Morley MD, Ronald Flores MD, Frida Gomez MD, Khoi Fonseca DO, Earl Alexis MD,  Ady Esparza MD, Shirley Waterhouse, CNP,  Marely Crowell, CNP, Reyna Farias, CNP, Rigoberto Hanson, CNP,  Joyce Elise, DNP, Estefany Mckenzie, CNP, Chetna Arita, CNP, Emily Diaz, CNP, Nathaly Lange, CNP, Michela Lira, CNP, Bret Hart PA-C, Christa Burnett, CNS, Jessica Blanco, CNP, Barbara Klein, CNP         [facility]   IN-PATIENT SERVICE   [facility]    Discharge Summary     Patient ID: Kyle Guzman  :  1970   MRN: [id number]     ACCOUNT:  [id number]   Patient's PCP: Sally Donis DO  Admit Date: 2024   Discharge Date: 10/2/2024  Length of Stay: 6  Code Status:  Full Code  Admitting Physician: Boyd Aquino MD  Discharge Physician: Boyd Aquino MD     Active Discharge Diagnoses:     Hospital Problem Lists:  Principal Problem (Resolved):    Cavitating mass in right upper lung lobe  Active Problems:    Lung infection    COPD without exacerbation (HCC)    Arthritis    Immunocompromised status associated with infection (HCC)    Respiratory abnormality      Admission Condition:  fair     Discharged Condition: good    Hospital Stay:     Hospital Course:  Kyle E Ulch is a 53 y.o. male who was admitted for the management of Cavitating mass in right upper lung lobe , presented to ER with No chief complaint on file.    This is a 53-year-old male that presents with a approximately 7 to 8-day history of  in the upper lobes. 3. No acute intra-abdominal or pelvic process.       Consultations:    Consults:     Final Specialist Recommendations/Findings:   IP CONSULT TO PULMONOLOGY  IP CONSULT TO INFECTIOUS DISEASES      The patient was seen and examined on day of discharge and this discharge summary is in conjunction with any daily progress note from day of discharge.    Discharge plan:     Disposition: Home    Physician Follow Up:   Sally Donis, DO  [address]  [address]  [facility] [address] [phone]    Follow up  Post hosp follow up visit.    Eloy Morfin MD  [address] [facility] [address] [facility]  [address]  [facility] [id number] [phone]    Follow up      Drew Overton MD  [address]  [facility] [address] [phone]             Requiring Further Evaluation/Follow Up POST HOSPITALIZATION/Incidental Findings: follow up op with PCP regarding the lab work and imaging done while inpatient.    Diet: regular diet    Activity: As tolerated    Instructions to Patient:   Follow up OP with PCP , pulm and ID  Continue meds as prescribed.    Discharge Medications:      Medication List        START taking these medications      budesonide-formoterol 160-4.5 MCG/ACT Aero  Commonly known as: SYMBICORT  Inhale 2 puffs into the lungs in the morning and 2 puffs in the evening.     moxifloxacin 400 MG tablet  Commonly known as: AVELOX  Take 1 tablet by mouth daily for 21 days            CONTINUE taking these medications      Dupixent 300 MG/2ML Sopn injection  Generic drug: dupilumab  Inject 300mg SQ at every 2 weeks     ibuprofen 800 MG tablet  Commonly known as: ADVIL;MOTRIN  Take 1 tablet by mouth 3 times daily (with meals)            STOP taking these medications      cyclobenzaprine 5 MG tablet  Commonly known as: FLEXERIL               Where to Get Your Medications        These medications were sent to [facility] [address] [place] [address] [address] [place] [address] -  [phone] -  [phone]  [address] [place] [address]

## 2024-10-02 NOTE — PROGRESS NOTES
Pulmonary Critical Care Progress Note    Patient seen for the follow up of Cavitating mass in right upper lung lobe     Subjective:    He is out of isolation.  He is tolerating diet   He denies chest pain.  Has occasional dry cough.  No new complaints  Examination:    Vitals: /81   Pulse 73   Temp 98.2 °F (36.8 °C) (Oral)   Resp 16   Ht 1.829 m (6')   Wt 64 kg (141 lb 1.6 oz)   SpO2 99%   BMI 19.14 kg/m²   SpO2  Av %  Min: 99 %  Max: 99 %  General appearance: alert and cooperative with exam  Neck: No JVD  Lungs: Decreased breath sound no rales or wheezing  Heart: regular rate and rhythm, S1, S2 normal, no gallop  Abdomen: Soft, non tender, + BS  Extremities: no cyanosis or clubbing. No significant edema    LABs:    CBC:   Recent Labs     24  0522   WBC 8.3   HGB 13.1   HCT 38.3*        BMP:   Recent Labs     24  0522      K 4.3   CO2 26   BUN 8   CREATININE 0.9   LABGLOM >90   GLUCOSE 94       Radiology:  X-ray  ribs and chest  No opacity    CT chest                 Impression/recommendations:    Cavitating right upper lobe pneumonia new since recent x-ray doubt tuberculosis/atypical chest pain  Zosyn IV  Sputum culture  QuantiFERON test came back negative   ID on consult  Previously discussed with IR ; will plan for bronchoscopy if required per ID given recent onset process based on image/no need for IR guided biopsy  Check bronchoscopy BAL cultures   Follow-up CT chest outpatient; patient advise malignancy not excluded       COPD/emphysema/smoker  DuoNeb as needed  Symbicort  Smoking cessation advised  PFT outpatient     Okay to discharge home from pulmonary point   Follow-up patient within next few weeks   DVT prophylaxis      Drew Overton MD, MD, [facility]  Pulmonary Critical Care and Sleep Medicine,  [facility]  Cell: [phone]  Office: [phone]

## 2024-10-02 NOTE — CARE COORDINATION
DC Planning    Spoke with pt at bedside declines dc needs lives w wife. Now on RA. Declines dc needs. Plans for home on po abx.

## 2024-10-02 NOTE — PROGRESS NOTES
[facility]  Progress Note    Kyle Guzman  MRN: [id number]  Date: 10/2/2024  LOS: 6     Reason for F/U :   Right upper lobe cavitary pneumonia    Impression :   Right lung apical consolidative nodular density with central cavitation and there is a halo with patchy groundglass attenuation up to 1.3 cm thickness  COPD/emphysema  Eczema on Dupixent    Recommendations:   Patient continues on antimicrobial therapy with IV Zosyn and oral linezolid  Histoplasma antigen is still pending  Patient is doing well clinically, patient can be discharged on antimicrobial therapy with moxifloxacin  Patient will need outpatient follow-up for the BAL testing and follow-up imaging to assess for resolution of the right upper lobe cavitary lesion    Infection Control Recommendations:   Universal precautions    Discharge Planning:   Estimated Length of IV antimicrobials: While hospitalized  Patient will need Midline Catheter Insertion/ PICC line Insertion: No  Patient will need: Home IV , Infusion Center,  SNF,  LTAC: Undetermined  Patient willneed outpatient wound care: No    Medical Decision making / Summary of Stay:   Kyle Guzman is a 53 y.o.-year-old male who was initially admitted on 9/26/2024.   Kyle is seen and evaluated at bedside and has COPD, eczema for which she has been on Dupixent for over a year over a year.     The patient reports that starting about a week and a half ago he started having some right-sided pleurisy without any significant cough and he has been working on clearing out a barn for close to 2 months now and he thought he had pulled a muscle.     The patient went to an urgent care center last week and had a chest x-ray done that did not show any significant findings and the thought was that the patient had a musculoskeletal problem.  The patient then reports he started having fevers, chills, worsening pleurisy went to see his primary care physician had testing done and he has been found to

## 2024-10-03 NOTE — PLAN OF CARE
Problem: Discharge Planning  Goal: Discharge to home or other facility with appropriate resources  10/2/2024 1959 by Evie Fan, RN  Outcome: Adequate for Discharge  Flowsheets (Taken 10/2/2024 0740)  Discharge to home or other facility with appropriate resources:   Identify barriers to discharge with patient and caregiver   Arrange for needed discharge resources and transportation as appropriate   Identify discharge learning needs (meds, wound care, etc)   Refer to discharge planning if patient needs post-hospital services based on physician order or complex needs related to functional status, cognitive ability or social support system     Problem: Pain  Goal: Verbalizes/displays adequate comfort level or baseline comfort level  10/2/2024 1959 by Evie Fan, RN  Outcome: Adequate for Discharge  Flowsheets (Taken 10/2/2024 0735)  Verbalizes/displays adequate comfort level or baseline comfort level:   Encourage patient to monitor pain and request assistance   Assess pain using appropriate pain scale   Implement non-pharmacological measures as appropriate and evaluate response   Administer analgesics based on type and severity of pain and evaluate response   Notify Licensed Independent Practitioner if interventions unsuccessful or patient reports new pain     Problem: Safety - Adult  Goal: Free from fall injury  10/2/2024 1959 by Evie Fan, RN  Outcome: Adequate for Discharge     Problem: Infection - Adult  Goal: Absence of infection at discharge  10/2/2024 1959 by Evie Fan RN  Outcome: Adequate for Discharge  Flowsheets (Taken 10/2/2024 0740)  Absence of infection at discharge:   Assess and monitor for signs and symptoms of infection   Monitor lab/diagnostic results   Monitor all insertion sites i.e., indwelling lines, tubes and drains   Administer medications as ordered   Instruct and encourage patient and family to use good hand hygiene technique  Goal: Absence of infection during  dressing/incision, wound bed, drain sites and surrounding tissue

## 2024-10-03 NOTE — DISCHARGE INSTR - ACTIVITY
Requiring Further Evaluation/Follow Up POST HOSPITALIZATION/Incidental Findings: follow up outpatient with PCP regarding the lab work and imaging done while inpatient.     Diet: regular diet     Activity: As tolerated     Instructions to Patient:   Follow up outpatient with your PCP   Follow up with Pulmonary Physician Dr. Overton  Follow up with Infectious disease physician, Dr. Morfin   Continue meds as prescribed      Call your doctor if:  You have a new or higher fever.  You are coughing more deeply or more often.  You are not getting better after 2 days (48 hours).  You do not get better as expected.

## 2024-10-04 LAB
MICROORGANISM SPEC CULT: NORMAL
SERVICE CMNT-IMP: NORMAL
SPECIMEN DESCRIPTION: NORMAL

## 2024-10-07 LAB
MICROORGANISM SPEC CULT: NORMAL
MICROORGANISM/AGENT SPEC: NORMAL
SERVICE CMNT-IMP: NORMAL
SERVICE CMNT-IMP: NORMAL
SPECIMEN DESCRIPTION: NORMAL
SPECIMEN DESCRIPTION: NORMAL

## 2024-10-09 NOTE — PROGRESS NOTES
Physician Progress Note      PATIENT:               BRIDGER HUMPHREY  CSN #:                  [id number]  :                       1970  ADMIT DATE:       2024 6:22 PM  DISCH DATE:        10/2/2024 8:49 PM  RESPONDING  PROVIDER #:        Boyd Aquino MD          QUERY TEXT:    Pt admitted with cavitating RUL pneumonia treated with IV Zyvox and IV Zosyn.    Respiratory cultures growing gram-negative rods.  If possible, please   document in the progress notes and discharge summary if you are evaluating   and/or treating any of the following:    Note: CAP and HCAP indicate where the pneumonia was acquired, not a specific   type.    The medical record reflects the following:  Risk Factors: recently cleaned out av, immunosuppressed, former smoker,   COPD  Clinical Indicators: Admitted with cavitating RUL pneumonia. Respiratory   cultures growing gram-negative rods. CXR: Interval development of a 3.3 cm   right lung apex solid consolidative appearing nodular density with central   cavitation or bleb there is a Halo of patchy ground-glass attenuation up to   1.3 cm thickness. Findings not present on chest x-ray of 2024 and would   support infection rather than malignancy. Afebrile, WBC wnl  Treatment: IV Zyvox, IV Zosyn, IVF, ibuprofen, Pulmonology & ID consults,   labs, imaging    Thank you!    Joyce Ruvalcaba, RN, BSN, RHIT, CCDS  Clinical   Options provided:  -- Pneumonia, treating for gram negative organisms.  -- Other - I will add my own diagnosis  -- Disagree - Not applicable / Not valid  -- Disagree - Clinically unable to determine / Unknown  -- Refer to Clinical Documentation Reviewer    PROVIDER RESPONSE TEXT:    This patient has pneumonia, treating for gram negative organisms.    Query created by: Joyce Ruvalcaba on 10/9/2024 8:48 AM      Electronically signed by:  Boyd Aquino MD 10/9/2024 4:43 PM

## 2024-10-14 SDOH — HEALTH STABILITY: PHYSICAL HEALTH: ON AVERAGE, HOW MANY MINUTES DO YOU ENGAGE IN EXERCISE AT THIS LEVEL?: 150+ MIN

## 2024-10-14 SDOH — HEALTH STABILITY: PHYSICAL HEALTH: ON AVERAGE, HOW MANY DAYS PER WEEK DO YOU ENGAGE IN MODERATE TO STRENUOUS EXERCISE (LIKE A BRISK WALK)?: 7 DAYS

## 2024-10-16 ENCOUNTER — OFFICE VISIT (OUTPATIENT)
Dept: FAMILY MEDICINE CLINIC | Age: 54
End: 2024-10-16
Payer: COMMERCIAL

## 2024-10-16 VITALS
WEIGHT: 141.2 LBS | TEMPERATURE: 97.7 F | HEART RATE: 88 BPM | SYSTOLIC BLOOD PRESSURE: 114 MMHG | RESPIRATION RATE: 12 BRPM | HEIGHT: 72 IN | BODY MASS INDEX: 19.13 KG/M2 | OXYGEN SATURATION: 100 % | DIASTOLIC BLOOD PRESSURE: 78 MMHG

## 2024-10-16 DIAGNOSIS — J44.9 CHRONIC OBSTRUCTIVE PULMONARY DISEASE, UNSPECIFIED COPD TYPE (HCC): ICD-10-CM

## 2024-10-16 DIAGNOSIS — M19.90 ARTHRITIS: ICD-10-CM

## 2024-10-16 DIAGNOSIS — Z76.89 ENCOUNTER TO ESTABLISH CARE WITH NEW DOCTOR: Primary | ICD-10-CM

## 2024-10-16 DIAGNOSIS — J43.9 PULMONARY EMPHYSEMA, UNSPECIFIED EMPHYSEMA TYPE (HCC): ICD-10-CM

## 2024-10-16 DIAGNOSIS — F17.200 SMOKER: ICD-10-CM

## 2024-10-16 DIAGNOSIS — J85.1 ABSCESS OF RIGHT LUNG WITH PNEUMONIA, UNSPECIFIED PART OF LUNG (HCC): ICD-10-CM

## 2024-10-16 DIAGNOSIS — F41.9 ANXIETY: ICD-10-CM

## 2024-10-16 PROBLEM — J18.9 LUNG INFECTION: Status: RESOLVED | Noted: 2024-09-26 | Resolved: 2024-10-16

## 2024-10-16 PROBLEM — R06.9 RESPIRATORY ABNORMALITY: Status: RESOLVED | Noted: 2024-10-02 | Resolved: 2024-10-16

## 2024-10-16 PROBLEM — D84.81 IMMUNOCOMPROMISED STATUS ASSOCIATED WITH INFECTION (HCC): Status: RESOLVED | Noted: 2024-09-27 | Resolved: 2024-10-16

## 2024-10-16 PROBLEM — B99.9 IMMUNOCOMPROMISED STATUS ASSOCIATED WITH INFECTION (HCC): Status: RESOLVED | Noted: 2024-09-27 | Resolved: 2024-10-16

## 2024-10-16 PROCEDURE — 99204 OFFICE O/P NEW MOD 45 MIN: CPT | Performed by: FAMILY MEDICINE

## 2024-10-16 ASSESSMENT — ENCOUNTER SYMPTOMS
EYES NEGATIVE: 1
GASTROINTESTINAL NEGATIVE: 1
ALLERGIC/IMMUNOLOGIC NEGATIVE: 1
RESPIRATORY NEGATIVE: 1

## 2024-10-17 LAB
MICROORGANISM SPEC CULT: ABNORMAL
MICROORGANISM SPEC CULT: ABNORMAL
MICROORGANISM/AGENT SPEC: ABNORMAL
SERVICE CMNT-IMP: ABNORMAL
SPECIMEN DESCRIPTION: ABNORMAL

## 2024-11-19 ENCOUNTER — OFFICE VISIT (OUTPATIENT)
Dept: INFECTIOUS DISEASES | Age: 54
End: 2024-11-19
Payer: COMMERCIAL

## 2024-11-19 VITALS
TEMPERATURE: 99.3 F | HEART RATE: 103 BPM | HEIGHT: 72 IN | SYSTOLIC BLOOD PRESSURE: 122 MMHG | WEIGHT: 144 LBS | DIASTOLIC BLOOD PRESSURE: 81 MMHG | BODY MASS INDEX: 19.5 KG/M2

## 2024-11-19 DIAGNOSIS — J98.4 CAVITARY PNEUMONIA: Primary | ICD-10-CM

## 2024-11-19 DIAGNOSIS — J18.9 CAVITARY PNEUMONIA: Primary | ICD-10-CM

## 2024-11-19 DIAGNOSIS — J42 CHRONIC BRONCHITIS, UNSPECIFIED CHRONIC BRONCHITIS TYPE (HCC): ICD-10-CM

## 2024-11-19 PROCEDURE — 99213 OFFICE O/P EST LOW 20 MIN: CPT | Performed by: INTERNAL MEDICINE

## 2024-11-19 ASSESSMENT — ENCOUNTER SYMPTOMS
GASTROINTESTINAL NEGATIVE: 1
CHEST TIGHTNESS: 1

## 2024-11-19 NOTE — PROGRESS NOTES
assistance of a speech recognition program.  While intending to generate a document that actually reflects the content ofthe visit, the document can still have some errors including those of syntax and sound a like substitutions which may escape proof reading.  It such instances, actual meaning can be extrapolated by contextual diversion.

## 2024-12-03 ENCOUNTER — HOSPITAL ENCOUNTER (OUTPATIENT)
Dept: CT IMAGING | Age: 54
Discharge: HOME | End: 2024-12-05
Attending: INTERNAL MEDICINE
Payer: COMMERCIAL

## 2024-12-03 DIAGNOSIS — J18.9 CAVITARY PNEUMONIA: ICD-10-CM

## 2024-12-03 DIAGNOSIS — J98.4 CAVITARY PNEUMONIA: ICD-10-CM

## 2024-12-03 PROCEDURE — 71250 CT THORAX DX C-: CPT

## 2024-12-18 NOTE — PROGRESS NOTES
Dermatology Patient Note  [facility]  [facility] DERMATOLOGY  [address] [place] [address]  Dept: [phone]  Dept Fax: [phone]      VISITDATE: 12/19/2024   REFERRING PROVIDER: No ref. provider found      Kyle Guzman is a 54 y.o. male  who presents today in the office for:    Other (Patient presents in the office today as a 6 month follow up for prurigo nodularis. He was hospitalized and had to stop taking the dupixent.. He restarted it this past Friday. He is having flares on his left arm and left leg but he thinks this is because he had been off the dupixent. )      HISTORY OF PRESENT ILLNESS:  As above. Patient presents for a 6 month follow up for prurigo nodularis. At the last visit, 6/19/24, he was continued on triamcinolone cream and dupixent with some joint pain that will resolve in 2 days. He was advised to contact the office if joint pain persists. Patient reported stopping dupixent, as per patient message 10/2/24, due to being hospitalized for a severe lung infection as it lowers the immune system.     Patient states he had severe rib pain in September. Scan taken from PCP showed a mass on his lung, subsequently hospitalized for 7 days. He was thought to have a bacterial infection and started on an antibiotic, also taken off of dupixent at this time. Was cleared to restart last week. Last traveled outside the [place] in [place] to [place]. He is an . He does like to river fish in the winter.     MEDICAL PROBLEMS:  Patient Active Problem List    Diagnosis Date Noted    Abscess of right lung with pneumonia (HCC) 10/16/2024    Smoker 10/16/2024    Chronic obstructive pulmonary disease (HCC) 10/16/2024    Arthritis        CURRENT MEDICATIONS:   Current Outpatient Medications   Medication Sig Dispense Refill    budesonide-formoterol (SYMBICORT) 160-4.5 MCG/ACT AERO Inhale 2 puffs into the lungs in the morning and 2 puffs in the evening. 10.2 g 3

## 2024-12-19 ENCOUNTER — OFFICE VISIT (OUTPATIENT)
Age: 54
End: 2024-12-19
Payer: COMMERCIAL

## 2024-12-19 VITALS
TEMPERATURE: 97.3 F | DIASTOLIC BLOOD PRESSURE: 80 MMHG | BODY MASS INDEX: 19.1 KG/M2 | HEART RATE: 91 BPM | HEIGHT: 72 IN | SYSTOLIC BLOOD PRESSURE: 108 MMHG | OXYGEN SATURATION: 100 % | WEIGHT: 141 LBS

## 2024-12-19 DIAGNOSIS — L28.1 PRURIGO NODULARIS: Primary | ICD-10-CM

## 2024-12-19 DIAGNOSIS — Z71.89 OTHER SPECIFIED COUNSELING: ICD-10-CM

## 2024-12-19 DIAGNOSIS — L20.84 INTRINSIC ATOPIC DERMATITIS: ICD-10-CM

## 2024-12-19 PROCEDURE — 99214 OFFICE O/P EST MOD 30 MIN: CPT | Performed by: DERMATOLOGY

## 2024-12-19 NOTE — PATIENT INSTRUCTIONS
Prurigo nodularis and eczema  - continue triamcinolone cream BID  - okay to restart dupixent  Biologic continuation: Patient understands the increased risk of infection and potential increased risk of malignancy as a result of immunosuppression. Patient denies occurrence of infections aside from common colds or gastroenteritis. Patient was counseled to call if he/she develops any symptoms concerning for infection. Patient understands the need for continued lab monitoring while on the medication. Patient will not receive live vaccines while on the medication.   - I will send a note to Dr. Morfin, infectious disease, about his case

## 2025-02-13 ENCOUNTER — TELEPHONE (OUTPATIENT)
Dept: GASTROENTEROLOGY | Age: 55
End: 2025-02-13

## 2025-02-14 ENCOUNTER — PREP FOR PROCEDURE (OUTPATIENT)
Dept: GASTROENTEROLOGY | Age: 55
End: 2025-02-14

## 2025-02-14 DIAGNOSIS — Z12.11 SCREEN FOR COLON CANCER: ICD-10-CM

## 2025-02-14 RX ORDER — BISACODYL 5 MG/1
TABLET, DELAYED RELEASE ORAL
Qty: 4 TABLET | Refills: 0 | Status: SHIPPED | OUTPATIENT
Start: 2025-02-14

## 2025-02-14 RX ORDER — SODIUM, POTASSIUM,MAG SULFATES 17.5-3.13G
1 SOLUTION, RECONSTITUTED, ORAL ORAL ONCE
Qty: 1 EACH | Refills: 0 | Status: SHIPPED | OUTPATIENT
Start: 2025-02-14 | End: 2025-02-14

## 2025-02-14 NOTE — TELEPHONE ENCOUNTER
Procedure scheduled/Hamdani  Procedure: colonoscopy  Dx: screening  Referring: Sally Donis DO  Date: 022425  Time: 1:30pm/arrive 12:00pm  Hospital: [facility]  Bowel Prep: suprep/dulcolax  Patient advised by phone: phone/sent bowel prep thru MyChart  Clearance: none  GLP-1: none

## 2025-02-18 ENCOUNTER — OFFICE VISIT (OUTPATIENT)
Dept: INFECTIOUS DISEASES | Age: 55
End: 2025-02-18
Payer: COMMERCIAL

## 2025-02-18 VITALS
WEIGHT: 142 LBS | HEART RATE: 92 BPM | HEIGHT: 72 IN | DIASTOLIC BLOOD PRESSURE: 85 MMHG | TEMPERATURE: 98.4 F | BODY MASS INDEX: 19.23 KG/M2 | SYSTOLIC BLOOD PRESSURE: 130 MMHG

## 2025-02-18 DIAGNOSIS — J98.4 CAVITARY PNEUMONIA: Primary | ICD-10-CM

## 2025-02-18 DIAGNOSIS — J42 CHRONIC BRONCHITIS, UNSPECIFIED CHRONIC BRONCHITIS TYPE (HCC): ICD-10-CM

## 2025-02-18 DIAGNOSIS — J18.9 CAVITARY PNEUMONIA: Primary | ICD-10-CM

## 2025-02-18 PROCEDURE — 99213 OFFICE O/P EST LOW 20 MIN: CPT | Performed by: INTERNAL MEDICINE

## 2025-02-18 ASSESSMENT — ENCOUNTER SYMPTOMS
RESPIRATORY NEGATIVE: 1
GASTROINTESTINAL NEGATIVE: 1

## 2025-02-18 NOTE — PROGRESS NOTES
[facility]   Office Consult Note  Today's Date and Time: 2/18/2025, 3:59 PM    Impression:     1. Cavitary pneumonia    2. Chronic bronchitis, unspecified chronic bronchitis type (HCC)         Recommendations      Assessment & Plan  1. Pneumonia.  His condition has shown significant improvement, with no current symptoms indicative of pneumonia. The infection appears to have resolved, as evidenced by the reduction in cavity size from 3.3 cm to 1.8 cm on the December 3rd CT scan. He reports no coughing, phlegm, chest pain, or shortness of breath, and is back to work full time. The use of Dupixent, an immune modulator, may have contributed to the severity of the infection, but it is necessary for his treatment. He is advised to continue monitoring his symptoms and to follow up as needed. If symptoms such as coughing, shortness of breath, or fever re-emerge, a CT scan may be considered.           I have ordered the following medications/ labs:  No orders of the defined types were placed in this encounter.     No orders of the defined types were placed in this encounter.      Chief complaint/reason for consultation:     Chief Complaint   Patient presents with    Frequent Infections     F/u after CT        History of Present Illness:   Kyle Guzman is a 54 y.o.-year-old male who is seen at there request of No ref. provider found    History of Present Illness  The patient presents for a follow-up visit for pneumonia.    He was hospitalized in 09/2024 and 10/2024 with cavitary pneumonia. A follow-up CT in 11/2024 showed a reduction of the cavity. He was last seen in our office on 11/19/2024 and comes today for follow-up. He reports overall good health, with no respiratory distress or cough. He is not experiencing any expectoration, chest discomfort, or dyspnea. His mobility is unimpaired, and he has resumed full-time work without any breathlessness. A CT scan conducted on 12/03/2024 revealed a decrease

## 2025-02-24 ENCOUNTER — ANESTHESIA (OUTPATIENT)
Dept: OPERATING ROOM | Age: 55
End: 2025-02-24
Payer: COMMERCIAL

## 2025-02-24 ENCOUNTER — ANESTHESIA EVENT (OUTPATIENT)
Dept: OPERATING ROOM | Age: 55
End: 2025-02-24
Payer: COMMERCIAL

## 2025-02-24 ENCOUNTER — HOSPITAL ENCOUNTER (OUTPATIENT)
Age: 55
Setting detail: OUTPATIENT SURGERY
Discharge: HOME | End: 2025-02-24
Attending: INTERNAL MEDICINE | Admitting: INTERNAL MEDICINE
Payer: COMMERCIAL

## 2025-02-24 VITALS
BODY MASS INDEX: 19.23 KG/M2 | DIASTOLIC BLOOD PRESSURE: 83 MMHG | HEART RATE: 72 BPM | WEIGHT: 142 LBS | RESPIRATION RATE: 17 BRPM | TEMPERATURE: 97.2 F | HEIGHT: 72 IN | OXYGEN SATURATION: 96 % | SYSTOLIC BLOOD PRESSURE: 112 MMHG

## 2025-02-24 DIAGNOSIS — Z12.11 SCREEN FOR COLON CANCER: ICD-10-CM

## 2025-02-24 PROCEDURE — 3700000000 HC ANESTHESIA ATTENDED CARE: Performed by: INTERNAL MEDICINE

## 2025-02-24 PROCEDURE — 3700000001 HC ADD 15 MINUTES (ANESTHESIA): Performed by: INTERNAL MEDICINE

## 2025-02-24 PROCEDURE — 3609010600 HC COLONOSCOPY POLYPECTOMY SNARE/COLD BIOPSY: Performed by: INTERNAL MEDICINE

## 2025-02-24 PROCEDURE — 6360000002 HC RX W HCPCS: Performed by: NURSE ANESTHETIST, CERTIFIED REGISTERED

## 2025-02-24 PROCEDURE — 2709999900 HC NON-CHARGEABLE SUPPLY: Performed by: INTERNAL MEDICINE

## 2025-02-24 PROCEDURE — 88305 TISSUE EXAM BY PATHOLOGIST: CPT

## 2025-02-24 PROCEDURE — 2580000003 HC RX 258: Performed by: ANESTHESIOLOGY

## 2025-02-24 PROCEDURE — 7100000010 HC PHASE II RECOVERY - FIRST 15 MIN: Performed by: INTERNAL MEDICINE

## 2025-02-24 PROCEDURE — 7100000011 HC PHASE II RECOVERY - ADDTL 15 MIN: Performed by: INTERNAL MEDICINE

## 2025-02-24 RX ORDER — SODIUM CHLORIDE 9 MG/ML
INJECTION, SOLUTION INTRAVENOUS CONTINUOUS
Status: DISCONTINUED | OUTPATIENT
Start: 2025-02-24 | End: 2025-02-24 | Stop reason: HOSPADM

## 2025-02-24 RX ORDER — SODIUM CHLORIDE, SODIUM LACTATE, POTASSIUM CHLORIDE, CALCIUM CHLORIDE 600; 310; 30; 20 MG/100ML; MG/100ML; MG/100ML; MG/100ML
INJECTION, SOLUTION INTRAVENOUS CONTINUOUS
Status: DISCONTINUED | OUTPATIENT
Start: 2025-02-24 | End: 2025-02-24 | Stop reason: HOSPADM

## 2025-02-24 RX ORDER — SODIUM CHLORIDE 9 MG/ML
INJECTION, SOLUTION INTRAVENOUS PRN
Status: DISCONTINUED | OUTPATIENT
Start: 2025-02-24 | End: 2025-02-24 | Stop reason: HOSPADM

## 2025-02-24 RX ORDER — LIDOCAINE HYDROCHLORIDE 20 MG/ML
INJECTION, SOLUTION EPIDURAL; INFILTRATION; INTRACAUDAL; PERINEURAL
Status: DISCONTINUED | OUTPATIENT
Start: 2025-02-24 | End: 2025-02-24 | Stop reason: SDUPTHER

## 2025-02-24 RX ORDER — SODIUM CHLORIDE 0.9 % (FLUSH) 0.9 %
5-40 SYRINGE (ML) INJECTION EVERY 12 HOURS SCHEDULED
Status: DISCONTINUED | OUTPATIENT
Start: 2025-02-24 | End: 2025-02-24 | Stop reason: HOSPADM

## 2025-02-24 RX ORDER — PROPOFOL 10 MG/ML
INJECTION, EMULSION INTRAVENOUS
Status: DISCONTINUED | OUTPATIENT
Start: 2025-02-24 | End: 2025-02-24 | Stop reason: SDUPTHER

## 2025-02-24 RX ORDER — LIDOCAINE HYDROCHLORIDE 10 MG/ML
1 INJECTION, SOLUTION EPIDURAL; INFILTRATION; INTRACAUDAL; PERINEURAL
Status: DISCONTINUED | OUTPATIENT
Start: 2025-02-24 | End: 2025-02-24 | Stop reason: HOSPADM

## 2025-02-24 RX ORDER — SODIUM CHLORIDE 0.9 % (FLUSH) 0.9 %
5-40 SYRINGE (ML) INJECTION PRN
Status: DISCONTINUED | OUTPATIENT
Start: 2025-02-24 | End: 2025-02-24 | Stop reason: HOSPADM

## 2025-02-24 RX ADMIN — LIDOCAINE HYDROCHLORIDE 60 MG: 20 INJECTION, SOLUTION EPIDURAL; INFILTRATION; INTRACAUDAL; PERINEURAL at 13:46

## 2025-02-24 RX ADMIN — PROPOFOL 140 MCG/KG/MIN: 10 INJECTION, EMULSION INTRAVENOUS at 13:46

## 2025-02-24 RX ADMIN — SODIUM CHLORIDE, POTASSIUM CHLORIDE, SODIUM LACTATE AND CALCIUM CHLORIDE: 600; 310; 30; 20 INJECTION, SOLUTION INTRAVENOUS at 12:35

## 2025-02-24 ASSESSMENT — PAIN - FUNCTIONAL ASSESSMENT: PAIN_FUNCTIONAL_ASSESSMENT: 0-10

## 2025-02-24 ASSESSMENT — PAIN SCALES - GENERAL: PAINLEVEL_OUTOF10: 0

## 2025-02-24 NOTE — ANESTHESIA PRE PROCEDURE
(chronic obstructive pulmonary disease) (HCC)     COVID-19     Eczema        Past Surgical History:        Procedure Laterality Date    BRONCHOSCOPY N/A 2024    BRONCHOSCOPY performed by Drew Overton MD at [facility] OR    BUNIONECTOMY Left 2005    ELBOW SURGERY      fracture- 3 pins removed 6 weeks later    HERNIA REPAIR      KNEE ARTHROSCOPY Left        Social History:    Social History     Tobacco Use    Smoking status: Former     Current packs/day: 0.00     Average packs/day: 0.5 packs/day for 34.7 years (17.4 ttl pk-yrs)     Types: Cigarettes     Start date:      Quit date: 2024     Years since quittin.4    Smokeless tobacco: Never   Substance Use Topics    Alcohol use: Yes     Comment: 6 beers a week                                 Counseling given: Not Answered      Vital Signs (Current):   Vitals:    25 1219 25 1229   BP: (!) 120/90    Pulse: (!) 102    Resp: 16    Temp: 98.4 °F (36.9 °C)    SpO2: 97%    Weight:  64.4 kg (142 lb)   Height:  1.829 m (6')                                              BP Readings from Last 3 Encounters:   25 (!) 120/90   25 130/85   25 120/78       NPO Status: Time of last liquid consumption: 0500                        Time of last solid consumption: 830                        Date of last liquid consumption: 25                        Date of last solid food consumption: 25    BMI:   Wt Readings from Last 3 Encounters:   25 64.4 kg (142 lb)   25 64.4 kg (142 lb)   25 64.4 kg (142 lb)     Body mass index is 19.26 kg/m².    CBC:   Lab Results   Component Value Date/Time    WBC 8.3 2024 05:22 AM    RBC 4.03 2024 05:22 AM    HGB 13.1 2024 05:22 AM    HCT 38.3 2024 05:22 AM    MCV 95.0 2024 05:22 AM    RDW 12.0 2024 05:22 AM     2024 05:22 AM       CMP:   Lab Results   Component Value Date/Time     2025 10:16 AM    K 4.9 2025 10:16 AM

## 2025-02-24 NOTE — H&P
Boyd Aquino MD   ibuprofen (ADVIL;MOTRIN) 800 MG tablet Take 1 tablet by mouth 3 times daily (with meals)  Patient taking differently: Take 1 tablet by mouth 3 times daily (with meals) 2/18/25mm: pt reported he has not taken in 2 months 9/26/24   Sally Donis DO   dupilumab (DUPIXENT) 300 MG/2ML SOPN injection Inject 300mg SQ at every 2 weeks  Patient taking differently: Inject 300mg SQ at every 2 weeks    11/19/24mm- pt is not taking currenly 6/19/24   Didi Luque MD       This is a 54 y.o. male who is  pleasant, cooperative, alert and oriented x3, in no acute distress.    Heart: Heart sounds are normal.  Regular rate and rhythm without murmur, gallop or rub.   Lungs:ESSENTIALLY clear to auscultation without wheezes ,HAS CRACKLES IN THE LEFT BASE   Abdomen: soft, nontender, nondistended, no masses or organomegaly  soft, nontender, nondistended,  bowel sounds present .   PEDAL PULSES + 2 BILATERALLY NO CALF TENDERNESS NO EDEMA    Labs:  No results for input(s): \"HGB\", \"HCT\", \"WBC\", \"MCV\", \"PLATELET\", \"NA\", \"K\", \"CL\", \"CO2\", \"BUN\", \"CREATININE\", \"GLUCOSE\", \"INR\", \"PROTIME\", \"APTT\", \"AST\", \"ALT\", \"LABALBU\", \"HCG\" in the last 720 hours.    KATERIN NUNN, APRN - CNP  , ACNP-BC  Electronically signed 2/24/2025 at 12:25 PM      [facility]   Office Consult Note  Today's Date and Time: 2/18/2025, 3:59 PM    Impression:     1. Cavitary pneumonia    2. Chronic bronchitis, unspecified chronic bronchitis type (HCC)         Recommendations      Assessment & Plan  1. Pneumonia.  His condition has shown significant improvement, with no current symptoms indicative of pneumonia. The infection appears to have resolved, as evidenced by the reduction in cavity size from 3.3 cm to 1.8 cm on the December 3rd CT scan. He reports no coughing, phlegm, chest pain, or shortness of breath, and is back to work full time. The use of Dupixent, an immune modulator, may have contributed to the severity of the

## 2025-02-24 NOTE — DISCHARGE INSTRUCTIONS
[facility]    POST-ENDOSCOPY INSTRUCTIONS    1. ACTIVITY   No driving, operating machinery, or making important decisions for 24 hours.    Resume normal activity after 24 hours.  You may return to work after 24 hours.    2. DIET        Resume your usual diet unless specified below.   ***    3. MEDICATIONS    Resume your usual medications.     Do not consume alcohol, tranquilizers, or sleeping medications for 24 hour unless advised by your physician.                 4. PHYSICIAN FOLLOW-UP / INSTRUCTIONS    Please call the office/clinic in 10 days for biopsy results:      [  ] GI office:  [phone] option #2          Follow up with your family physician as planned.    6. NORMAL CHANGES YOU MAY EXPERIENCE AFTER ENDOSCOPY:       COLONOSCOPY     Passing of gas for several hours.     Some mild abdominal cramping.                You may feel fatigued for the next 24-48   hours due to the prep and sedation    7. CALL YOUR PHYSICIAN IF YOU EXPERIENCE ANY OF THE FOLLOWING      A.  Passing blood rectally or vomiting blood (color may be red or black)      B.  Severe abdominal pain or tenderness (that is not relieved by passing air)      C.  Fever, chills, or excessive sweating      D.  Persistent nausea or vomiting      E.  Redness or swelling at the IV site    If you have additional questions, PLEASE call your doctor or the [facility] GI Unit ([phone] option #2)        Recommendations:.  Follow-up with the biopsy results                                       Repeat colonoscopy in 3 months with 2-day prep                                       Follow-up with PCP

## 2025-02-24 NOTE — OP NOTE
Operative Note      Patient: Kyle Guzman  YOB: 1970  MRN: [id number]    Date of Procedure: 2/24/2025    Pre-Op Diagnosis Codes:      * Screen for colon cancer [Z12.11]         History of colon cancer in the mother    Post-Op Diagnosis:  Sigmoid and rectal polyps, sigmoid diverticulosis internal hemorrhoid       Procedure(s):  COLONOSCOPY POLYPECTOMY SNARE x2 and BIOPSY forcep x 1    Surgeon(s):  Jose Boyce MD    Assistant:   * No surgical staff found *    Anesthesia: Monitor Anesthesia Care    Estimated Blood Loss (mL): Minimal    Complications: None    Specimens:   ID Type Source Tests Collected by Time Destination   A : sigmoid colon polyp Tissue Sigmoid Colon SURGICAL PATHOLOGY Jose Boyce MD 2/24/2025 1359    B : rectal polyp Tissue Rectum SURGICAL PATHOLOGY Jose Boyce MD 2/24/2025 1405        Implants:  * No implants in log *      Drains: * No LDAs found *    Findings:  Infection Present At Time Of Surgery (PATOS) (choose all levels that have infection present):  No infection present        Scope withdrawal time: 14 min     Description of Procedure:  Informed consent was obtained from the patient after explanation of the procedure including indications, description of the procedure,  benefits and possible risks and complications of the procedure, and alternatives. Questions were answered.  The patient's history was reviewed and a directed physical examination was performed prior to the procedure.    Patient was monitored throughout the procedure with pulse oximetry and periodic assessment of vital signs. Patient was sedated as noted above. With the patient initially in the left lateral decubitus position, a digital rectal examination was performed and revealed negative without mass, lesions or tenderness.  The Olympus video colonoscope was placed in the patient's rectum and advanced without difficulty  to the cecum, which was identified by the ileocecal valve and

## 2025-02-26 LAB — SURGICAL PATHOLOGY REPORT: NORMAL

## 2025-03-04 ENCOUNTER — PREP FOR PROCEDURE (OUTPATIENT)
Dept: GASTROENTEROLOGY | Age: 55
End: 2025-03-04

## 2025-03-04 ENCOUNTER — TELEPHONE (OUTPATIENT)
Dept: GASTROENTEROLOGY | Age: 55
End: 2025-03-04

## 2025-03-04 DIAGNOSIS — Z86.0100 HX OF COLONIC POLYP: ICD-10-CM

## 2025-03-04 RX ORDER — SODIUM, POTASSIUM,MAG SULFATES 17.5-3.13G
1 SOLUTION, RECONSTITUTED, ORAL ORAL ONCE
Qty: 1 EACH | Refills: 0 | Status: SHIPPED | OUTPATIENT
Start: 2025-03-04 | End: 2025-03-04

## 2025-03-04 RX ORDER — POLYETHYLENE GLYCOL 3350 17 G/17G
POWDER, FOR SOLUTION ORAL
Qty: 238 G | Refills: 0 | Status: SHIPPED | OUTPATIENT
Start: 2025-03-04

## 2025-03-04 RX ORDER — BISACODYL 5 MG/1
TABLET, DELAYED RELEASE ORAL
Qty: 4 TABLET | Refills: 0 | Status: SHIPPED | OUTPATIENT
Start: 2025-03-04

## 2025-03-04 NOTE — TELEPHONE ENCOUNTER
----- Message from Dr. Jose Boyce MD sent at 2/24/2025  2:12 PM EST -----  Repeat colonoscopy in 3 months with 2-day prep    Procedure scheduled/Austen  Procedure: colonoscopy  Dx: hx of colon polyps  Referring: Sally Donis DO  Date: 051225  Time: 8:00am/arrive 6:30am  Hospital: [facility]  Bowel Prep: 2 day Suprep  Patient advised by phone: phone/mailed bowel prep instructions  Clearance: none  GLP-1: none

## 2025-03-12 ENCOUNTER — PATIENT MESSAGE (OUTPATIENT)
Dept: GASTROENTEROLOGY | Age: 55
End: 2025-03-12

## 2025-03-16 PROBLEM — Z12.11 SCREEN FOR COLON CANCER: Status: RESOLVED | Noted: 2025-02-14 | Resolved: 2025-03-16

## 2025-03-21 DIAGNOSIS — L28.1 PRURIGO NODULARIS: ICD-10-CM

## 2025-03-23 RX ORDER — DUPILUMAB 300 MG/2ML
INJECTION, SOLUTION SUBCUTANEOUS
Qty: 12 ML | Refills: 1 | Status: SHIPPED | OUTPATIENT
Start: 2025-03-23

## 2025-04-15 NOTE — PROGRESS NOTES
Dermatology Patient Note  [facility]  [facility] DERMATOLOGY  [address] [place] [address]  Dept: [phone]  Dept Fax: [phone]      VISITDATE: 4/21/2025   REFERRING PROVIDER: No ref. provider found      Kyle Guzman is a 54 y.o. male  who presents today in the office for:    Other (Patient presents today as a 4mo f/u Dupixent for Prurigo nodularis.  Patient admits skin is dong well.  No new concerns today.  )      HISTORY OF PRESENT ILLNESS:  Patient presents for 4 month prurigo nodularis secondary to probably atopic or contact dermatitis follow up. At , he was restarted on Dupixent and continued on triamcinolone cream. Noted that a letter would be sent to Dr. Mofrin, infectious disease, about his case.     Today, patient reports that his skin and lungs are doing well. He only reports a mild patches with scabbing on his bilateral lower extremities. He denies any side effects on the Dupixent. No new or worsening join pain or conjunctivitis. Concerning his lungs, he completed a follow up CT about a week ago which continues to show a reduction in size of cavitary focus in right lung.    MEDICAL PROBLEMS:  Patient Active Problem List    Diagnosis Date Noted    Hx of colonic polyp 03/04/2025    Abscess of right lung with pneumonia ([facility]) 10/16/2024    Smoker 10/16/2024    COPD (chronic obstructive pulmonary disease) ([facility]) 10/16/2024    Arthritis        CURRENT MEDICATIONS:   Current Outpatient Medications   Medication Sig Dispense Refill    dupilumab (DUPIXENT) 300 MG/2ML SOAJ injection INJECT 300 MG UNDER THE SKIN EVERY 2 WEEKS 12 mL 1    bisacodyl 5 MG EC tablet Take as directed for bowel prep/colonoscopy 4 tablet 0    polyethylene glycol (MIRALAX) 17 GM/SCOOP powder Take per bowel prep instructions 238 g 0    budesonide-formoterol (SYMBICORT) 160-4.5 MCG/ACT AERO Inhale 2 puffs into the lungs in the morning and 2 puffs in the evening. 10.2 g 3     Current

## 2025-04-21 ENCOUNTER — OFFICE VISIT (OUTPATIENT)
Age: 55
End: 2025-04-21
Payer: COMMERCIAL

## 2025-04-21 VITALS
HEART RATE: 97 BPM | SYSTOLIC BLOOD PRESSURE: 146 MMHG | TEMPERATURE: 99 F | OXYGEN SATURATION: 100 % | HEIGHT: 72 IN | DIASTOLIC BLOOD PRESSURE: 104 MMHG | WEIGHT: 139.8 LBS | BODY MASS INDEX: 18.93 KG/M2

## 2025-04-21 DIAGNOSIS — L28.1 PRURIGO NODULARIS: Primary | ICD-10-CM

## 2025-04-21 DIAGNOSIS — L20.84 INTRINSIC ATOPIC DERMATITIS: ICD-10-CM

## 2025-04-21 PROCEDURE — 99213 OFFICE O/P EST LOW 20 MIN: CPT | Performed by: DERMATOLOGY

## 2025-05-12 ENCOUNTER — HOSPITAL ENCOUNTER (OUTPATIENT)
Age: 55
Setting detail: OUTPATIENT SURGERY
Discharge: HOME | End: 2025-05-12
Attending: INTERNAL MEDICINE | Admitting: INTERNAL MEDICINE
Payer: COMMERCIAL

## 2025-05-12 ENCOUNTER — ANESTHESIA EVENT (OUTPATIENT)
Dept: OPERATING ROOM | Age: 55
End: 2025-05-12
Payer: COMMERCIAL

## 2025-05-12 ENCOUNTER — ANESTHESIA (OUTPATIENT)
Dept: OPERATING ROOM | Age: 55
End: 2025-05-12
Payer: COMMERCIAL

## 2025-05-12 ENCOUNTER — TELEPHONE (OUTPATIENT)
Dept: GASTROENTEROLOGY | Age: 55
End: 2025-05-12

## 2025-05-12 VITALS
WEIGHT: 137 LBS | DIASTOLIC BLOOD PRESSURE: 88 MMHG | BODY MASS INDEX: 18.56 KG/M2 | HEART RATE: 57 BPM | TEMPERATURE: 97.5 F | HEIGHT: 72 IN | SYSTOLIC BLOOD PRESSURE: 114 MMHG | RESPIRATION RATE: 18 BRPM | OXYGEN SATURATION: 100 %

## 2025-05-12 DIAGNOSIS — Z86.0100 HX OF COLONIC POLYP: ICD-10-CM

## 2025-05-12 PROCEDURE — 3700000001 HC ADD 15 MINUTES (ANESTHESIA): Performed by: INTERNAL MEDICINE

## 2025-05-12 PROCEDURE — 3609010600 HC COLONOSCOPY POLYPECTOMY SNARE/COLD BIOPSY: Performed by: INTERNAL MEDICINE

## 2025-05-12 PROCEDURE — 2580000003 HC RX 258: Performed by: ANESTHESIOLOGY

## 2025-05-12 PROCEDURE — 7100000011 HC PHASE II RECOVERY - ADDTL 15 MIN: Performed by: INTERNAL MEDICINE

## 2025-05-12 PROCEDURE — 2709999900 HC NON-CHARGEABLE SUPPLY: Performed by: INTERNAL MEDICINE

## 2025-05-12 PROCEDURE — 7100000010 HC PHASE II RECOVERY - FIRST 15 MIN: Performed by: INTERNAL MEDICINE

## 2025-05-12 PROCEDURE — 88305 TISSUE EXAM BY PATHOLOGIST: CPT

## 2025-05-12 PROCEDURE — 3700000000 HC ANESTHESIA ATTENDED CARE: Performed by: INTERNAL MEDICINE

## 2025-05-12 PROCEDURE — 6360000002 HC RX W HCPCS: Performed by: NURSE ANESTHETIST, CERTIFIED REGISTERED

## 2025-05-12 RX ORDER — SODIUM CHLORIDE 9 MG/ML
INJECTION, SOLUTION INTRAVENOUS PRN
Status: DISCONTINUED | OUTPATIENT
Start: 2025-05-12 | End: 2025-05-12 | Stop reason: HOSPADM

## 2025-05-12 RX ORDER — SODIUM CHLORIDE 0.9 % (FLUSH) 0.9 %
5-40 SYRINGE (ML) INJECTION EVERY 12 HOURS SCHEDULED
Status: DISCONTINUED | OUTPATIENT
Start: 2025-05-12 | End: 2025-05-12 | Stop reason: HOSPADM

## 2025-05-12 RX ORDER — SODIUM CHLORIDE 0.9 % (FLUSH) 0.9 %
5-40 SYRINGE (ML) INJECTION PRN
Status: DISCONTINUED | OUTPATIENT
Start: 2025-05-12 | End: 2025-05-12 | Stop reason: HOSPADM

## 2025-05-12 RX ORDER — SODIUM CHLORIDE 9 MG/ML
INJECTION, SOLUTION INTRAVENOUS CONTINUOUS
Status: DISCONTINUED | OUTPATIENT
Start: 2025-05-12 | End: 2025-05-12 | Stop reason: HOSPADM

## 2025-05-12 RX ORDER — FENTANYL CITRATE 50 UG/ML
25 INJECTION, SOLUTION INTRAMUSCULAR; INTRAVENOUS EVERY 5 MIN PRN
Status: DISCONTINUED | OUTPATIENT
Start: 2025-05-12 | End: 2025-05-12 | Stop reason: HOSPADM

## 2025-05-12 RX ORDER — LIDOCAINE HYDROCHLORIDE 10 MG/ML
1 INJECTION, SOLUTION EPIDURAL; INFILTRATION; INTRACAUDAL; PERINEURAL
Status: DISCONTINUED | OUTPATIENT
Start: 2025-05-13 | End: 2025-05-12 | Stop reason: HOSPADM

## 2025-05-12 RX ORDER — OXYCODONE HYDROCHLORIDE 5 MG/1
5 TABLET ORAL
Status: DISCONTINUED | OUTPATIENT
Start: 2025-05-12 | End: 2025-05-12 | Stop reason: HOSPADM

## 2025-05-12 RX ORDER — SODIUM CHLORIDE, SODIUM LACTATE, POTASSIUM CHLORIDE, CALCIUM CHLORIDE 600; 310; 30; 20 MG/100ML; MG/100ML; MG/100ML; MG/100ML
INJECTION, SOLUTION INTRAVENOUS CONTINUOUS
Status: DISCONTINUED | OUTPATIENT
Start: 2025-05-12 | End: 2025-05-12 | Stop reason: HOSPADM

## 2025-05-12 RX ORDER — NALOXONE HYDROCHLORIDE 0.4 MG/ML
INJECTION, SOLUTION INTRAMUSCULAR; INTRAVENOUS; SUBCUTANEOUS PRN
Status: DISCONTINUED | OUTPATIENT
Start: 2025-05-12 | End: 2025-05-12 | Stop reason: HOSPADM

## 2025-05-12 RX ORDER — METOCLOPRAMIDE HYDROCHLORIDE 5 MG/ML
10 INJECTION INTRAMUSCULAR; INTRAVENOUS
Status: DISCONTINUED | OUTPATIENT
Start: 2025-05-12 | End: 2025-05-12 | Stop reason: HOSPADM

## 2025-05-12 RX ORDER — PROPOFOL 10 MG/ML
INJECTION, EMULSION INTRAVENOUS
Status: DISCONTINUED | OUTPATIENT
Start: 2025-05-12 | End: 2025-05-12 | Stop reason: SDUPTHER

## 2025-05-12 RX ORDER — HYDROMORPHONE HYDROCHLORIDE 1 MG/ML
0.5 INJECTION, SOLUTION INTRAMUSCULAR; INTRAVENOUS; SUBCUTANEOUS EVERY 5 MIN PRN
Status: DISCONTINUED | OUTPATIENT
Start: 2025-05-12 | End: 2025-05-12 | Stop reason: HOSPADM

## 2025-05-12 RX ORDER — ONDANSETRON 2 MG/ML
4 INJECTION INTRAMUSCULAR; INTRAVENOUS
Status: DISCONTINUED | OUTPATIENT
Start: 2025-05-12 | End: 2025-05-12 | Stop reason: HOSPADM

## 2025-05-12 RX ORDER — LIDOCAINE HYDROCHLORIDE 20 MG/ML
INJECTION, SOLUTION EPIDURAL; INFILTRATION; INTRACAUDAL; PERINEURAL
Status: DISCONTINUED | OUTPATIENT
Start: 2025-05-12 | End: 2025-05-12 | Stop reason: SDUPTHER

## 2025-05-12 RX ADMIN — PROPOFOL 140 MCG/KG/MIN: 10 INJECTION, EMULSION INTRAVENOUS at 08:16

## 2025-05-12 RX ADMIN — SODIUM CHLORIDE, SODIUM LACTATE, POTASSIUM CHLORIDE, AND CALCIUM CHLORIDE: .6; .31; .03; .02 INJECTION, SOLUTION INTRAVENOUS at 06:55

## 2025-05-12 RX ADMIN — PROPOFOL 50 MG: 10 INJECTION, EMULSION INTRAVENOUS at 08:14

## 2025-05-12 RX ADMIN — LIDOCAINE HYDROCHLORIDE 60 MG: 20 INJECTION, SOLUTION EPIDURAL; INFILTRATION; INTRACAUDAL; PERINEURAL at 08:14

## 2025-05-12 ASSESSMENT — ENCOUNTER SYMPTOMS
SHORTNESS OF BREATH: 0
WHEEZING: 0
RHINORRHEA: 0
EYES NEGATIVE: 1
COUGH: 0
CHEST TIGHTNESS: 0
SORE THROAT: 0

## 2025-05-12 ASSESSMENT — LIFESTYLE VARIABLES: SMOKING_STATUS: 0

## 2025-05-12 ASSESSMENT — PAIN - FUNCTIONAL ASSESSMENT
PAIN_FUNCTIONAL_ASSESSMENT: 0-10
PAIN_FUNCTIONAL_ASSESSMENT: FACE, LEGS, ACTIVITY, CRY, AND CONSOLABILITY (FLACC)

## 2025-05-12 NOTE — DISCHARGE INSTRUCTIONS
[facility]    POST-ENDOSCOPY INSTRUCTIONS    1. ACTIVITY   No driving, operating machinery, or making important decisions for 24 hours.    Resume normal activity after 24 hours.  You may return to work after 24 hours.    2. DIET        Resume your usual diet unless specified below.   ***    3. MEDICATIONS    Resume your usual medications.     Do not consume alcohol, tranquilizers, or sleeping medications for 24 hour unless advised by your physician.                 4. PHYSICIAN FOLLOW-UP / INSTRUCTIONS    Please call the office/clinic in 10 days for biopsy results:      [  ] GI office:  [phone] option #2          Follow up with your family physician as planned.    6. NORMAL CHANGES YOU MAY EXPERIENCE AFTER ENDOSCOPY:       COLONOSCOPY    Passing of gas for several hours.      Some mild abdominal cramping.                You may feel fatigued for the next 24-48   hours due to the prep and sedation    7. CALL YOUR PHYSICIAN IF YOU EXPERIENCE ANY OF THE FOLLOWING      A.  Passing blood rectally or vomiting blood (color may be red or black)      B.  Severe abdominal pain or tenderness (that is not relieved by passing air)      C.  Fever, chills, or excessive sweating      D.  Persistent nausea or vomiting      E.  Redness or swelling at the IV site    If you have additional questions, PLEASE call your doctor or the [facility] GI Unit ([phone] option #2)

## 2025-05-12 NOTE — ANESTHESIA PRE PROCEDURE
Department of Anesthesiology  Preprocedure Note       Name:  Kyle Guzman   Age:  54 y.o.  :  1970                                          MRN:  [id number]         Date:  2025      Surgeon: Surgeon(s):  Jose Boyce MD    Procedure: Procedure(s):  COLONOSCOPY DIAGNOSTIC    Medications prior to admission:   Prior to Admission medications    Medication Sig Start Date End Date Taking? Authorizing Provider   bisacodyl 5 MG EC tablet Take as directed for bowel prep/colonoscopy 25  Yes Jose Boyce MD   polyethylene glycol (MIRALAX) 17 GM/SCOOP powder Take per bowel prep instructions 25  Yes Jose Boyce MD   budesonide-formoterol (SYMBICORT) 160-4.5 MCG/ACT AERO Inhale 2 puffs into the lungs in the morning and 2 puffs in the evening. 10/2/24  Yes Boyd Aquino MD   dupilumab (DUPIXENT) 300 MG/2ML SOAJ injection Inject 2 mLs into the skin every 14 days 25   Didi Luque MD   dupilumab (DUPIXENT) 300 MG/2ML SOAJ injection INJECT 300 MG UNDER THE SKIN EVERY 2 WEEKS 3/23/25   Didi Luque MD       Current medications:    Current Facility-Administered Medications   Medication Dose Route Frequency Provider Last Rate Last Admin    [START ON 2025] lidocaine PF 1 % injection 1 mL  1 mL IntraDERmal Once PRN Tip Enrique W, DO        0.9 % sodium chloride infusion   IntraVENous Continuous Tip Enrique W, DO        lactated ringers infusion   IntraVENous Continuous Tip Enrique W,  mL/hr at 25 0655 New Bag at 25 0655    sodium chloride flush 0.9 % injection 5-40 mL  5-40 mL IntraVENous 2 times per day Tip Enrique W, DO        sodium chloride flush 0.9 % injection 5-40 mL  5-40 mL IntraVENous PRN Tip Enrique W, DO        0.9 % sodium chloride infusion   IntraVENous PRN Tip Enrique W, DO           Allergies:    Allergies   Allergen Reactions    Chantix [Varenicline]      Bad temper       Problem List:    Patient

## 2025-05-12 NOTE — ANESTHESIA POSTPROCEDURE EVALUATION
Department of Anesthesiology  Postprocedure Note    Patient: Kyle Guzman  MRN: [id number]  YOB: 1970  Date of evaluation: 5/12/2025    Procedure Summary       Date: 05/12/25 Room / Location: [address]    Anesthesia Start: 0813 Anesthesia Stop: 0853    Procedure: COLONOSCOPY POLYPECTOMY cold SNARE Diagnosis:       Hx of colonic polyp      (Hx of colonic polyp [Z86.0100])    Surgeons: Jose Boyce MD Responsible Provider: Huber Tinoco MD    Anesthesia Type: MAC ASA Status: 2            Anesthesia Type: No value filed.    Lona Phase I: Lona Score: 10    Lona Phase II: Lona Score: 10    Anesthesia Post Evaluation    Patient location during evaluation: PACU  Patient participation: complete - patient participated  Level of consciousness: awake  Airway patency: patent  Nausea & Vomiting: no nausea  Cardiovascular status: blood pressure returned to baseline  Respiratory status: acceptable  Hydration status: euvolemic  Comments: Multimodal analgesia pain management as indicated by procedure  Multimodal analgesia pain management approach  Pain management: adequate    No notable events documented.

## 2025-05-12 NOTE — H&P
History and Physical Service   [facility]    HISTORY AND PHYSICAL EXAMINATION            Date of Evaluation: 5/12/2025  Patient name:  Kyle Guzman  MRN:   [id number]  YOB: 1970  PCP:    Sally Donis DO    History Obtained From:     Patient, medical records    History of Present Illness:     This is Kyle Guzman a 54 y.o. male who presents today for a COLONOSCOPY DIAGNOSTIC by Jose Boyce MD for Hx of colonic polyp. Patient denies bowel changes. He denies bloody tarry stools, diarrhea alternating with constipation, nausea, vomiting, abdominal pain or unintentional weight loss. Patient followed bowel prep until watery clear. Has had previous colonoscopy. FH colon cancer in his mother. Personal history of polyps. Denies fever, chills, shortness of breath, cough, congestion, wheezing, chest pain, open sores or wounds. Denies hx of diabetes. Denies any current blood thinning medications.     Past Medical History:     Past Medical History:   Diagnosis Date    Arthritis     COPD (chronic obstructive pulmonary disease) ([facility])     COVID-19     Eczema     Pneumonia 09/26/2024    Fungal pneumonia        Past Surgical History:     Past Surgical History:   Procedure Laterality Date    BRONCHOSCOPY N/A 09/30/2024    BRONCHOSCOPY performed by Drew Overton MD at [facility] OR    BUNIONECTOMY Left 2005    COLONOSCOPY N/A 2/24/2025    COLONOSCOPY POLYPECTOMY SNARE x2 and BIOPSY forcep x 1 performed by Jose Boyce MD at [facility] OR    ELBOW SURGERY  1988    fracture- 3 pins removed 6 weeks later    HERNIA REPAIR      KNEE ARTHROSCOPY Left         Medications Prior to Admission:     Prior to Admission medications    Medication Sig Start Date End Date Taking? Authorizing Provider   bisacodyl 5 MG EC tablet Take as directed for bowel prep/colonoscopy 5/7/25  Yes Jose Boyce MD   polyethylene glycol (MIRALAX) 17 GM/SCOOP powder Take per bowel prep instructions 5/7/25

## 2025-05-12 NOTE — OP NOTE
Operative Note      Patient: Kyle Guzman  YOB: 1970  MRN: [id number]    Date of Procedure: 5/12/2025    Pre-Op Diagnosis Codes:      * Hx of colonic polyp [Z86.0100]    Post-Op Diagnosis: Cecal, descending, sigmoid colon polyps, sigmoid diverticulosis and internal hemorrhoids       Procedure(s):  COLONOSCOPY POLYPECTOMY cold SNARE    Surgeon(s):  Jose Boyce MD    Assistant:   * No surgical staff found *    Anesthesia: Monitor Anesthesia Care    Estimated Blood Loss (mL): Minimal    Complications: None    Specimens:   ID Type Source Tests Collected by Time Destination   A : cecal polyps Tissue Cecum SURGICAL PATHOLOGY Jose Byoce MD 5/12/2025 0827    B : descending colon polyp Tissue Colon-Descending SURGICAL PATHOLOGY Jose Boyce MD 5/12/2025 0835    C : sigmoid colon polyp Tissue Sigmoid Colon SURGICAL PATHOLOGY Jose Boyce MD 5/12/2025 0841    D : abnormal mucosa rectum bx Tissue Rectum SURGICAL PATHOLOGY Jose Boyce MD 5/12/2025 0842        Implants:  * No implants in log *      Drains: * No LDAs found *    Findings:  Infection Present At Time Of Surgery (PATOS) (choose all levels that have infection present):  No infection present      Scope withdrawal time: 18 min     Description of Procedure:  Informed consent was obtained from the patient after explanation of the procedure including indications, description of the procedure,  benefits and possible risks and complications of the procedure, and alternatives. Questions were answered.  The patient's history was reviewed and a directed physical examination was performed prior to the procedure.    Patient was monitored throughout the procedure with pulse oximetry and periodic assessment of vital signs. Patient was sedated as noted above. With the patient initially in the left lateral decubitus position, a digital rectal examination was performed and revealed negative without mass, lesions or

## 2025-05-13 LAB — SURGICAL PATHOLOGY REPORT: NORMAL

## 2025-05-22 NOTE — TELEPHONE ENCOUNTER
Writer notified patient in regards to Biopsy Results with Colon from 5/12/25 in that the Biopsies its noted to be Tubular Adenoma's and Hyperplastic Polyps which are noted to be precancerous and to repeat Colonoscopy in 3 years and follow up with PCP. Patient thanked writer. Please advise.

## 2025-05-22 NOTE — TELEPHONE ENCOUNTER
Patient called on 5/21/25 and left a VM at 245 pm wanting results of Biopsies with Procedure completed from 5/12/25. Please advise.

## 2025-05-29 DIAGNOSIS — L28.1 PRURIGO NODULARIS: ICD-10-CM

## 2025-05-29 PROBLEM — D12.6 TUBULAR ADENOMA OF COLON: Status: ACTIVE | Noted: 2025-05-29

## 2025-05-29 RX ORDER — DUPILUMAB 300 MG/2ML
300 INJECTION, SOLUTION SUBCUTANEOUS
Qty: 12 ML | Refills: 1 | Status: ACTIVE | OUTPATIENT
Start: 2025-05-29 | End: 2025-11-25

## 2025-08-20 ENCOUNTER — HOSPITAL ENCOUNTER (OUTPATIENT)
Dept: GENERAL RADIOLOGY | Facility: CLINIC | Age: 55
Discharge: HOME | End: 2025-08-22
Payer: COMMERCIAL

## 2025-08-20 DIAGNOSIS — M79.641 RIGHT HAND PAIN: ICD-10-CM

## 2025-08-20 PROCEDURE — 73130 X-RAY EXAM OF HAND: CPT

## (undated) DEVICE — SINGLE-USE POLYPECTOMY SNARE: Brand: CAPTIFLEX

## (undated) DEVICE — ADAPTER TBNG LUER STUB 15 GA INTMED

## (undated) DEVICE — ELECTRODE PT RET AD L9FT HI MOIST COND ADH HYDRGEL CORDED

## (undated) DEVICE — TRAP SPEC POLYP REM STRNR CLN DSGN MAGNIFYING WIND DISP

## (undated) DEVICE — STAZ ENDO KIT: Brand: MEDLINE INDUSTRIES, INC.

## (undated) DEVICE — FORCEPS BX L240CM JAW DIA2.4MM WRK CHN 2.8MM ORNG L CAP W/

## (undated) DEVICE — SNARE ENDOSCP L240CM OD24MM LOOP W15MM RND INSUL STIFF BRAID

## (undated) DEVICE — SOLUTION IRRIG 1000ML 0.9% SOD CHL USP POUR PLAS BTL

## (undated) DEVICE — SYRINGE MED 20ML STD CLR PLAS LUERLOCK TIP N CTRL DISP

## (undated) DEVICE — TRAP,MUCUS SPECIMEN, 80CC: Brand: MEDLINE

## (undated) DEVICE — ADAPTER TBNG DIA15MM SWVL FBROPT BRONCHSCP TERM 2 AXIS PEEP

## (undated) DEVICE — SINGLE USE BIOPSY VALVE MAJ-210: Brand: SINGLE USE BIOPSY VALVE (STERILE)

## (undated) DEVICE — SINGLE USE SUCTION VALVE MAJ-209: Brand: SINGLE USE SUCTION VALVE (STERILE)

## (undated) DEVICE — CONTAINER,SPECIMEN,OR STERILE,4OZ: Brand: MEDLINE

## (undated) DEVICE — BITEBLOCK ENDOSCP 60FR MAXI WHT POLYETH STURDY W/ VELC WVN

## (undated) DEVICE — SINGLE-USE BIOPSY FORCEPS: Brand: RADIAL JAW 4